# Patient Record
Sex: MALE | Race: WHITE | NOT HISPANIC OR LATINO
[De-identification: names, ages, dates, MRNs, and addresses within clinical notes are randomized per-mention and may not be internally consistent; named-entity substitution may affect disease eponyms.]

---

## 2017-08-18 ENCOUNTER — APPOINTMENT (OUTPATIENT)
Dept: GASTROENTEROLOGY | Facility: CLINIC | Age: 37
End: 2017-08-18
Payer: COMMERCIAL

## 2017-08-18 VITALS
WEIGHT: 197 LBS | HEIGHT: 70 IN | TEMPERATURE: 97.8 F | SYSTOLIC BLOOD PRESSURE: 140 MMHG | HEART RATE: 82 BPM | OXYGEN SATURATION: 99 % | DIASTOLIC BLOOD PRESSURE: 88 MMHG | BODY MASS INDEX: 28.2 KG/M2

## 2017-08-18 DIAGNOSIS — R12 HEARTBURN: ICD-10-CM

## 2017-08-18 DIAGNOSIS — M25.512 PAIN IN LEFT SHOULDER: ICD-10-CM

## 2017-08-18 DIAGNOSIS — R19.7 DIARRHEA, UNSPECIFIED: ICD-10-CM

## 2017-08-18 DIAGNOSIS — G89.29 PAIN IN LEFT SHOULDER: ICD-10-CM

## 2017-08-18 DIAGNOSIS — R10.9 UNSPECIFIED ABDOMINAL PAIN: ICD-10-CM

## 2017-08-18 DIAGNOSIS — R19.4 CHANGE IN BOWEL HABIT: ICD-10-CM

## 2017-08-18 DIAGNOSIS — Z80.0 FAMILY HISTORY OF MALIGNANT NEOPLASM OF DIGESTIVE ORGANS: ICD-10-CM

## 2017-08-18 DIAGNOSIS — K21.9 GASTRO-ESOPHAGEAL REFLUX DISEASE W/OUT ESOPHAGITIS: ICD-10-CM

## 2017-08-18 PROCEDURE — 99244 OFF/OP CNSLTJ NEW/EST MOD 40: CPT

## 2017-08-18 RX ORDER — ISOPROPYL ALCOHOL 700 MG/ML
LIQUID TOPICAL
Refills: 0 | Status: ACTIVE | COMMUNITY

## 2017-08-18 RX ORDER — PANTOPRAZOLE 20 MG/1
20 TABLET, DELAYED RELEASE ORAL DAILY
Qty: 30 | Refills: 3 | Status: ACTIVE | COMMUNITY
Start: 2017-08-18

## 2017-08-18 RX ORDER — BIFIDOBACTERIUM LONGUM 10MM CELL
4 CAPSULE ORAL
Qty: 30 | Refills: 3 | Status: ACTIVE | OUTPATIENT
Start: 2017-08-18

## 2017-08-18 RX ORDER — AMOXICILLIN 500 MG/1
500 CAPSULE ORAL
Qty: 42 | Refills: 0 | Status: ACTIVE | COMMUNITY
Start: 2017-06-08

## 2017-08-18 RX ORDER — BUPRENORPHINE HYDROCHLORIDE, NALOXONE HYDROCHLORIDE 4; 1 MG/1; MG/1
4-1 FILM, SOLUBLE BUCCAL; SUBLINGUAL
Qty: 30 | Refills: 0 | Status: ACTIVE | COMMUNITY
Start: 2017-05-11

## 2017-08-24 ENCOUNTER — APPOINTMENT (OUTPATIENT)
Dept: GASTROENTEROLOGY | Facility: CLINIC | Age: 37
End: 2017-08-24

## 2017-08-28 ENCOUNTER — APPOINTMENT (OUTPATIENT)
Dept: ULTRASOUND IMAGING | Facility: CLINIC | Age: 37
End: 2017-08-28

## 2017-10-04 ENCOUNTER — TRANSCRIPTION ENCOUNTER (OUTPATIENT)
Age: 37
End: 2017-10-04

## 2017-10-16 ENCOUNTER — TRANSCRIPTION ENCOUNTER (OUTPATIENT)
Age: 37
End: 2017-10-16

## 2018-01-29 ENCOUNTER — TRANSCRIPTION ENCOUNTER (OUTPATIENT)
Age: 38
End: 2018-01-29

## 2018-02-07 ENCOUNTER — TRANSCRIPTION ENCOUNTER (OUTPATIENT)
Age: 38
End: 2018-02-07

## 2018-02-24 ENCOUNTER — EMERGENCY (EMERGENCY)
Facility: HOSPITAL | Age: 38
LOS: 1 days | Discharge: HOME | End: 2018-02-24
Attending: EMERGENCY MEDICINE | Admitting: EMERGENCY MEDICINE
Payer: COMMERCIAL

## 2018-02-24 VITALS
DIASTOLIC BLOOD PRESSURE: 88 MMHG | SYSTOLIC BLOOD PRESSURE: 139 MMHG | HEART RATE: 117 BPM | RESPIRATION RATE: 16 BRPM | TEMPERATURE: 98 F | OXYGEN SATURATION: 100 %

## 2018-02-24 LAB
ALBUMIN SERPL ELPH-MCNC: 4.5 G/DL — SIGNIFICANT CHANGE UP (ref 3.3–5)
ALP SERPL-CCNC: 32 U/L — LOW (ref 40–120)
ALT FLD-CCNC: 16 U/L RC — SIGNIFICANT CHANGE UP (ref 10–45)
ANION GAP SERPL CALC-SCNC: 13 MMOL/L — SIGNIFICANT CHANGE UP (ref 5–17)
AST SERPL-CCNC: 18 U/L — SIGNIFICANT CHANGE UP (ref 10–40)
BASOPHILS # BLD AUTO: 0 K/UL — SIGNIFICANT CHANGE UP (ref 0–0.2)
BASOPHILS NFR BLD AUTO: 0.5 % — SIGNIFICANT CHANGE UP (ref 0–2)
BILIRUB SERPL-MCNC: 0.3 MG/DL — SIGNIFICANT CHANGE UP (ref 0.2–1.2)
BUN SERPL-MCNC: 8 MG/DL — SIGNIFICANT CHANGE UP (ref 7–23)
CALCIUM SERPL-MCNC: 9.6 MG/DL — SIGNIFICANT CHANGE UP (ref 8.4–10.5)
CHLORIDE SERPL-SCNC: 104 MMOL/L — SIGNIFICANT CHANGE UP (ref 96–108)
CO2 SERPL-SCNC: 27 MMOL/L — SIGNIFICANT CHANGE UP (ref 22–31)
CREAT SERPL-MCNC: 0.82 MG/DL — SIGNIFICANT CHANGE UP (ref 0.5–1.3)
EOSINOPHIL # BLD AUTO: 0.2 K/UL — SIGNIFICANT CHANGE UP (ref 0–0.5)
EOSINOPHIL NFR BLD AUTO: 3.2 % — SIGNIFICANT CHANGE UP (ref 0–6)
GLUCOSE SERPL-MCNC: 115 MG/DL — HIGH (ref 70–99)
HCT VFR BLD CALC: 38.1 % — LOW (ref 39–50)
HGB BLD-MCNC: 13.9 G/DL — SIGNIFICANT CHANGE UP (ref 13–17)
LYMPHOCYTES # BLD AUTO: 1.2 K/UL — SIGNIFICANT CHANGE UP (ref 1–3.3)
LYMPHOCYTES # BLD AUTO: 17.2 % — SIGNIFICANT CHANGE UP (ref 13–44)
MCHC RBC-ENTMCNC: 31.6 PG — SIGNIFICANT CHANGE UP (ref 27–34)
MCHC RBC-ENTMCNC: 36.4 GM/DL — HIGH (ref 32–36)
MCV RBC AUTO: 86.7 FL — SIGNIFICANT CHANGE UP (ref 80–100)
MONOCYTES # BLD AUTO: 0.4 K/UL — SIGNIFICANT CHANGE UP (ref 0–0.9)
MONOCYTES NFR BLD AUTO: 6.1 % — SIGNIFICANT CHANGE UP (ref 2–14)
NEUTROPHILS # BLD AUTO: 5.1 K/UL — SIGNIFICANT CHANGE UP (ref 1.8–7.4)
NEUTROPHILS NFR BLD AUTO: 73 % — SIGNIFICANT CHANGE UP (ref 43–77)
PLATELET # BLD AUTO: 209 K/UL — SIGNIFICANT CHANGE UP (ref 150–400)
POTASSIUM SERPL-MCNC: 3.9 MMOL/L — SIGNIFICANT CHANGE UP (ref 3.5–5.3)
POTASSIUM SERPL-SCNC: 3.9 MMOL/L — SIGNIFICANT CHANGE UP (ref 3.5–5.3)
PROT SERPL-MCNC: 7.5 G/DL — SIGNIFICANT CHANGE UP (ref 6–8.3)
RBC # BLD: 4.4 M/UL — SIGNIFICANT CHANGE UP (ref 4.2–5.8)
RBC # FLD: 10.9 % — SIGNIFICANT CHANGE UP (ref 10.3–14.5)
SODIUM SERPL-SCNC: 144 MMOL/L — SIGNIFICANT CHANGE UP (ref 135–145)
TROPONIN T SERPL-MCNC: <0.01 NG/ML — SIGNIFICANT CHANGE UP (ref 0–0.06)
WBC # BLD: 7 K/UL — SIGNIFICANT CHANGE UP (ref 3.8–10.5)
WBC # FLD AUTO: 7 K/UL — SIGNIFICANT CHANGE UP (ref 3.8–10.5)

## 2018-02-24 PROCEDURE — 93005 ELECTROCARDIOGRAM TRACING: CPT

## 2018-02-24 PROCEDURE — 99285 EMERGENCY DEPT VISIT HI MDM: CPT

## 2018-02-24 PROCEDURE — 80053 COMPREHEN METABOLIC PANEL: CPT

## 2018-02-24 PROCEDURE — 85027 COMPLETE CBC AUTOMATED: CPT

## 2018-02-24 PROCEDURE — 71046 X-RAY EXAM CHEST 2 VIEWS: CPT

## 2018-02-24 PROCEDURE — 71046 X-RAY EXAM CHEST 2 VIEWS: CPT | Mod: 26

## 2018-02-24 PROCEDURE — 84484 ASSAY OF TROPONIN QUANT: CPT

## 2018-02-24 PROCEDURE — 99283 EMERGENCY DEPT VISIT LOW MDM: CPT | Mod: 25

## 2018-02-24 RX ORDER — ESCITALOPRAM OXALATE 10 MG/1
0 TABLET, FILM COATED ORAL
Qty: 0 | Refills: 0 | COMMUNITY

## 2018-02-24 NOTE — ED PROVIDER NOTE - PHYSICAL EXAMINATION
Gen: No acute distress, alert, cooperative  Head: Normocephalic, Atraumatic  HEENT: PERRL, oral mucosa moist, normal conjunctiva. Left neck "sore" when palpated  Lung: CTAB, no respiratory distress, no crackles or wheezes  CV: rrr, no murmur  Abd: soft, NTND  MSK: No LE edema, Mildly tender diffusely over left shoulder  Neuro: No focal neurologic deficits  Skin: No rash  Psych: normal affect, follows commands Gen: No acute distress, alert, cooperative  Head: Normocephalic, Atraumatic  HEENT: PERRL, oral mucosa moist, normal conjunctiva. Left neck "sore" when palpated  Lung: CTAB, no respiratory distress, no crackles or wheezes  CV: rrr, no murmur  Abd: soft, NTND  MSK: No LE edema, Mildly tender diffusely over left shoulder, left arm, and left forearm. No numbness/tingling.   Neuro: No focal neurologic deficits  Skin: No rash  Psych: normal affect, follows commands

## 2018-02-24 NOTE — ED PROVIDER NOTE - SHIFT CHANGE DETAILS
***ATTENDING ADDENDUM (Dr. Lux Gilliland): I have received handoff from [facility]. Awaiting completion of ED diagnostics, including cardiac markers and CXR. Likely discharge home if negative. Will continue to observe and monitor closely.

## 2018-02-24 NOTE — ED PROVIDER NOTE - MEDICAL DECISION MAKING DETAILS
38yo male with hx of anxiety presenting with 3 weeks of intermittent left shoulder, chest, and arm pain. Numbness and tingling in left fingers and arm occasionally. Presently, mildly sore in shoulder and arm, otherwise appearing well. EKG normal. MSK vs cardiac. Tropx1, CXR, cbc/cmp, reassess. Likely d/c with ortho f/u. 38yo male with hx of anxiety presenting with 3 weeks of intermittent left shoulder, chest, and arm pain. Numbness and tingling in left fingers and arm occasionally. Presently, mildly sore in shoulder and arm, otherwise appearing well. EKG normal. MSK vs cardiac. Tropx1, CXR, cbc/cmp, reassess. Likely d/c with ortho f/u.    VIPIN Sol MD: Pt is a 36 y/o male with PMH of anxiety who p/w 3 weeks of intermittent left shoulder, upper chest and L arm pain. States that pain is worse after lifting weights, after sleeping on L shoulder, or when wearing tight clothing that pinches L shoulder/upper arm. Notes intermittent tingling in tips of fingers(most in middle and ring finger) and tingling in left forearm. Also notes his left forearm and left shoulder tender. Was seen at urgent care a week ago and told it was MSK related. Has only taken motrin for pain, was also rx flexeril, has not tried. Recently recovering from resolving sinus infection. Denies new injuries. Denies current pain. Denies: CP (current), SOB, leg pain/swelling, travel, trauma, immobilization, f/c, cough, HA, dizziness.  DDx: MSK pain (rotator cuff injury, muscle spasm, radiculopathy), less likely GERD / cardiac / pulmonary  Plan: CXR, trop, ekg, basic labs, likely d/c home with Ortho f/u

## 2018-02-24 NOTE — ED PROVIDER NOTE - OBJECTIVE STATEMENT
38yo male with hx of anxiety presenting with 3 weeks of intermittent left shoulder, chest, and arm pain. No known triggers, gets it both exertional and at rest. Notes intermittent tingling in tips of fingers(most in middle and ring finger) and tingling in left forearm. A few times tingling has gone into left jaw and forehead. Also notes his left forearm and left shoulder tender. Was seen at urgent care a week ago and told it was MSK related.  Recently recovering from resolving sinus infection, pain in shoulder/arm/chest worse with cough. Years ago, shattered 2-3 ribs on left side, no intervention at the time, still mildly tender.  Denies urinary symptoms, SOB, headache, abdominal pain. + resolving cough

## 2018-02-24 NOTE — ED ADULT NURSE NOTE - OBJECTIVE STATEMENT
37 male presents with left arm pain and intermittent numbness x 3 weeks.  States pain can involve ribs on left side which is the site of an old injury.  Pain worsens with movement.  Patient has been doing more exercises x 3 weeks.

## 2018-02-25 RX ORDER — CYCLOBENZAPRINE HYDROCHLORIDE 10 MG/1
1 TABLET, FILM COATED ORAL
Qty: 15 | Refills: 0 | OUTPATIENT
Start: 2018-02-25 | End: 2018-03-01

## 2018-05-21 ENCOUNTER — TRANSCRIPTION ENCOUNTER (OUTPATIENT)
Age: 38
End: 2018-05-21

## 2018-07-13 ENCOUNTER — APPOINTMENT (OUTPATIENT)
Dept: ORTHOPEDIC SURGERY | Facility: CLINIC | Age: 38
End: 2018-07-13
Payer: COMMERCIAL

## 2018-07-13 VITALS
BODY MASS INDEX: 23.7 KG/M2 | DIASTOLIC BLOOD PRESSURE: 82 MMHG | WEIGHT: 160 LBS | HEART RATE: 72 BPM | HEIGHT: 69 IN | SYSTOLIC BLOOD PRESSURE: 131 MMHG

## 2018-07-13 DIAGNOSIS — G89.29 LOW BACK PAIN: ICD-10-CM

## 2018-07-13 DIAGNOSIS — M54.5 LOW BACK PAIN: ICD-10-CM

## 2018-07-13 DIAGNOSIS — M54.2 CERVICALGIA: ICD-10-CM

## 2018-07-13 PROCEDURE — 72100 X-RAY EXAM L-S SPINE 2/3 VWS: CPT

## 2018-07-13 PROCEDURE — 72040 X-RAY EXAM NECK SPINE 2-3 VW: CPT

## 2018-07-13 PROCEDURE — 99203 OFFICE O/P NEW LOW 30 MIN: CPT

## 2018-07-13 PROCEDURE — 73590 X-RAY EXAM OF LOWER LEG: CPT | Mod: RT

## 2018-07-13 RX ORDER — AMOXICILLIN AND CLAVULANATE POTASSIUM 875; 125 MG/1; MG/1
875-125 TABLET, COATED ORAL
Qty: 14 | Refills: 0 | Status: ACTIVE | COMMUNITY
Start: 2018-01-29

## 2018-07-23 PROBLEM — Z80.0 FAMILY HISTORY OF COLON CANCER: Status: ACTIVE | Noted: 2017-08-18

## 2018-09-12 ENCOUNTER — APPOINTMENT (OUTPATIENT)
Dept: ORTHOPEDIC SURGERY | Facility: CLINIC | Age: 38
End: 2018-09-12

## 2021-04-28 ENCOUNTER — OUTPATIENT (OUTPATIENT)
Dept: OUTPATIENT SERVICES | Facility: HOSPITAL | Age: 41
LOS: 1 days | End: 2021-04-28
Payer: COMMERCIAL

## 2021-04-28 DIAGNOSIS — Z11.52 ENCOUNTER FOR SCREENING FOR COVID-19: ICD-10-CM

## 2021-04-28 PROBLEM — F41.9 ANXIETY DISORDER, UNSPECIFIED: Chronic | Status: ACTIVE | Noted: 2018-02-24

## 2021-04-28 LAB — SARS-COV-2 RNA SPEC QL NAA+PROBE: SIGNIFICANT CHANGE UP

## 2021-04-28 PROCEDURE — U0005: CPT

## 2021-04-28 PROCEDURE — C9803: CPT

## 2021-04-28 PROCEDURE — U0003: CPT

## 2021-08-02 ENCOUNTER — TRANSCRIPTION ENCOUNTER (OUTPATIENT)
Age: 41
End: 2021-08-02

## 2021-08-03 ENCOUNTER — TRANSCRIPTION ENCOUNTER (OUTPATIENT)
Age: 41
End: 2021-08-03

## 2022-04-17 ENCOUNTER — TRANSCRIPTION ENCOUNTER (OUTPATIENT)
Age: 42
End: 2022-04-17

## 2023-01-09 ENCOUNTER — NON-APPOINTMENT (OUTPATIENT)
Age: 43
End: 2023-01-09

## 2023-02-02 ENCOUNTER — OUTPATIENT (OUTPATIENT)
Dept: OUTPATIENT SERVICES | Facility: HOSPITAL | Age: 43
LOS: 1 days | Discharge: STILL ADMITTED OR ADMINISTRATIVE | End: 2023-02-02

## 2023-02-03 DIAGNOSIS — F33.1 MAJOR DEPRESSIVE DISORDER, RECURRENT, MODERATE: ICD-10-CM

## 2023-02-16 ENCOUNTER — TRANSCRIPTION ENCOUNTER (OUTPATIENT)
Age: 43
End: 2023-02-16

## 2023-05-07 ENCOUNTER — NON-APPOINTMENT (OUTPATIENT)
Age: 43
End: 2023-05-07

## 2023-10-02 ENCOUNTER — APPOINTMENT (OUTPATIENT)
Dept: ORTHOPEDIC SURGERY | Facility: CLINIC | Age: 43
End: 2023-10-02
Payer: COMMERCIAL

## 2023-10-02 VITALS — BODY MASS INDEX: 18.96 KG/M2 | HEIGHT: 69 IN | WEIGHT: 128 LBS

## 2023-10-02 DIAGNOSIS — S32.810A MULTIPLE FRACTURES OF PELVIS WITH STABLE DISRUPTION OF PELVIC RING, INITIAL ENCOUNTER FOR CLOSED FRACTURE: ICD-10-CM

## 2023-10-02 DIAGNOSIS — F11.11 OPIOID ABUSE, IN REMISSION: ICD-10-CM

## 2023-10-02 DIAGNOSIS — Z87.19 PERSONAL HISTORY OF OTHER DISEASES OF THE DIGESTIVE SYSTEM: ICD-10-CM

## 2023-10-02 DIAGNOSIS — F41.9 ANXIETY DISORDER, UNSPECIFIED: ICD-10-CM

## 2023-10-02 DIAGNOSIS — F32.A ANXIETY DISORDER, UNSPECIFIED: ICD-10-CM

## 2023-10-02 DIAGNOSIS — Z86.19 PERSONAL HISTORY OF OTHER INFECTIOUS AND PARASITIC DISEASES: ICD-10-CM

## 2023-10-02 PROCEDURE — 73503 X-RAY EXAM HIP UNI 4/> VIEWS: CPT | Mod: LT

## 2023-10-02 PROCEDURE — 27197 CLSD TX PELVIC RING FX: CPT

## 2023-10-02 PROCEDURE — 99203 OFFICE O/P NEW LOW 30 MIN: CPT | Mod: 57

## 2023-10-09 ENCOUNTER — APPOINTMENT (OUTPATIENT)
Dept: ORTHOPEDIC SURGERY | Facility: CLINIC | Age: 43
End: 2023-10-09

## 2024-06-17 NOTE — ED ADULT TRIAGE NOTE - RESPIRATORY RATE (BREATHS/MIN)
16
Expiration Date (Optional): 10-
Urine Pregnancy Test Result: negative
Detail Level: None
Performed By: Jenny Joshi CMA
Lot # (Optional): [id number]

## 2025-02-13 ENCOUNTER — INPATIENT (INPATIENT)
Facility: HOSPITAL | Age: 45
LOS: 7 days | Discharge: HOME | DRG: 392 | End: 2025-02-21
Attending: HOSPITALIST | Admitting: STUDENT IN AN ORGANIZED HEALTH CARE EDUCATION/TRAINING PROGRAM
Payer: COMMERCIAL

## 2025-02-13 VITALS
SYSTOLIC BLOOD PRESSURE: 106 MMHG | HEART RATE: 77 BPM | TEMPERATURE: 98 F | HEIGHT: 69 IN | DIASTOLIC BLOOD PRESSURE: 73 MMHG | OXYGEN SATURATION: 100 % | RESPIRATION RATE: 16 BRPM | WEIGHT: 104.94 LBS

## 2025-02-13 DIAGNOSIS — Z29.9 ENCOUNTER FOR PROPHYLACTIC MEASURES, UNSPECIFIED: ICD-10-CM

## 2025-02-13 DIAGNOSIS — R63.4 ABNORMAL WEIGHT LOSS: ICD-10-CM

## 2025-02-13 LAB
ALBUMIN SERPL ELPH-MCNC: 4.2 G/DL — SIGNIFICANT CHANGE UP (ref 3.3–5)
ALP SERPL-CCNC: 36 U/L — LOW (ref 40–120)
ALT FLD-CCNC: 21 U/L — SIGNIFICANT CHANGE UP (ref 10–45)
ANION GAP SERPL CALC-SCNC: 14 MMOL/L — SIGNIFICANT CHANGE UP (ref 5–17)
AST SERPL-CCNC: 16 U/L — SIGNIFICANT CHANGE UP (ref 10–40)
BASOPHILS # BLD AUTO: 0.04 K/UL — SIGNIFICANT CHANGE UP (ref 0–0.2)
BASOPHILS NFR BLD AUTO: 0.5 % — SIGNIFICANT CHANGE UP (ref 0–2)
BILIRUB SERPL-MCNC: 0.4 MG/DL — SIGNIFICANT CHANGE UP (ref 0.2–1.2)
BUN SERPL-MCNC: 14 MG/DL — SIGNIFICANT CHANGE UP (ref 7–23)
CALCIUM SERPL-MCNC: 10.1 MG/DL — SIGNIFICANT CHANGE UP (ref 8.4–10.5)
CHLORIDE SERPL-SCNC: 100 MMOL/L — SIGNIFICANT CHANGE UP (ref 96–108)
CO2 SERPL-SCNC: 24 MMOL/L — SIGNIFICANT CHANGE UP (ref 22–31)
CREAT SERPL-MCNC: 0.73 MG/DL — SIGNIFICANT CHANGE UP (ref 0.5–1.3)
EGFR: 116 ML/MIN/1.73M2 — SIGNIFICANT CHANGE UP
EOSINOPHIL # BLD AUTO: 0.05 K/UL — SIGNIFICANT CHANGE UP (ref 0–0.5)
EOSINOPHIL NFR BLD AUTO: 0.6 % — SIGNIFICANT CHANGE UP (ref 0–6)
GLUCOSE SERPL-MCNC: 100 MG/DL — HIGH (ref 70–99)
HCT VFR BLD CALC: 40.1 % — SIGNIFICANT CHANGE UP (ref 39–50)
HGB BLD-MCNC: 13.7 G/DL — SIGNIFICANT CHANGE UP (ref 13–17)
IMM GRANULOCYTES NFR BLD AUTO: 0.6 % — SIGNIFICANT CHANGE UP (ref 0–0.9)
LIDOCAIN IGE QN: 49 U/L — SIGNIFICANT CHANGE UP (ref 7–60)
LYMPHOCYTES # BLD AUTO: 0.92 K/UL — LOW (ref 1–3.3)
LYMPHOCYTES # BLD AUTO: 10.8 % — LOW (ref 13–44)
MAGNESIUM SERPL-MCNC: 2.3 MG/DL — SIGNIFICANT CHANGE UP (ref 1.6–2.6)
MCHC RBC-ENTMCNC: 28.8 PG — SIGNIFICANT CHANGE UP (ref 27–34)
MCHC RBC-ENTMCNC: 34.2 G/DL — SIGNIFICANT CHANGE UP (ref 32–36)
MCV RBC AUTO: 84.4 FL — SIGNIFICANT CHANGE UP (ref 80–100)
MONOCYTES # BLD AUTO: 0.83 K/UL — SIGNIFICANT CHANGE UP (ref 0–0.9)
MONOCYTES NFR BLD AUTO: 9.7 % — SIGNIFICANT CHANGE UP (ref 2–14)
NEUTROPHILS # BLD AUTO: 6.65 K/UL — SIGNIFICANT CHANGE UP (ref 1.8–7.4)
NEUTROPHILS NFR BLD AUTO: 77.8 % — HIGH (ref 43–77)
NRBC BLD AUTO-RTO: 0 /100 WBCS — SIGNIFICANT CHANGE UP (ref 0–0)
PHOSPHATE SERPL-MCNC: 3 MG/DL — SIGNIFICANT CHANGE UP (ref 2.5–4.5)
PLATELET # BLD AUTO: 394 K/UL — SIGNIFICANT CHANGE UP (ref 150–400)
POTASSIUM SERPL-MCNC: 4 MMOL/L — SIGNIFICANT CHANGE UP (ref 3.5–5.3)
POTASSIUM SERPL-SCNC: 4 MMOL/L — SIGNIFICANT CHANGE UP (ref 3.5–5.3)
PROT SERPL-MCNC: 7.6 G/DL — SIGNIFICANT CHANGE UP (ref 6–8.3)
RBC # BLD: 4.75 M/UL — SIGNIFICANT CHANGE UP (ref 4.2–5.8)
RBC # FLD: 11.9 % — SIGNIFICANT CHANGE UP (ref 10.3–14.5)
SODIUM SERPL-SCNC: 138 MMOL/L — SIGNIFICANT CHANGE UP (ref 135–145)
WBC # BLD: 8.54 K/UL — SIGNIFICANT CHANGE UP (ref 3.8–10.5)
WBC # FLD AUTO: 8.54 K/UL — SIGNIFICANT CHANGE UP (ref 3.8–10.5)

## 2025-02-13 PROCEDURE — 74177 CT ABD & PELVIS W/CONTRAST: CPT | Mod: 26

## 2025-02-13 PROCEDURE — 99285 EMERGENCY DEPT VISIT HI MDM: CPT

## 2025-02-13 PROCEDURE — 99223 1ST HOSP IP/OBS HIGH 75: CPT

## 2025-02-13 RX ORDER — GABAPENTIN 400 MG/1
100 CAPSULE ORAL THREE TIMES A DAY
Refills: 0 | Status: DISCONTINUED | OUTPATIENT
Start: 2025-02-13 | End: 2025-02-14

## 2025-02-13 RX ORDER — LEVOCETIRIZINE DIHYDROCHLORIDE 5 MG/1
1 TABLET ORAL
Refills: 0 | DISCHARGE

## 2025-02-13 RX ORDER — ONDANSETRON HCL/PF 4 MG/2 ML
4 VIAL (ML) INJECTION EVERY 8 HOURS
Refills: 0 | Status: DISCONTINUED | OUTPATIENT
Start: 2025-02-13 | End: 2025-02-21

## 2025-02-13 RX ORDER — MELATONIN 5 MG
3 TABLET ORAL AT BEDTIME
Refills: 0 | Status: DISCONTINUED | OUTPATIENT
Start: 2025-02-13 | End: 2025-02-21

## 2025-02-13 RX ORDER — ACETAMINOPHEN 500 MG/5ML
650 LIQUID (ML) ORAL EVERY 6 HOURS
Refills: 0 | Status: DISCONTINUED | OUTPATIENT
Start: 2025-02-13 | End: 2025-02-21

## 2025-02-13 RX ORDER — SERTRALINE 100 MG/1
50 TABLET, FILM COATED ORAL DAILY
Refills: 0 | Status: DISCONTINUED | OUTPATIENT
Start: 2025-02-13 | End: 2025-02-21

## 2025-02-13 RX ORDER — MAGNESIUM, ALUMINUM HYDROXIDE 200-200 MG
30 TABLET,CHEWABLE ORAL EVERY 4 HOURS
Refills: 0 | Status: DISCONTINUED | OUTPATIENT
Start: 2025-02-13 | End: 2025-02-21

## 2025-02-13 RX ORDER — MAGNESIUM, ALUMINUM HYDROXIDE 200-200 MG
10 TABLET,CHEWABLE ORAL ONCE
Refills: 0 | Status: COMPLETED | OUTPATIENT
Start: 2025-02-13 | End: 2025-02-13

## 2025-02-13 RX ORDER — ALPRAZOLAM 0.5 MG
1 TABLET, EXTENDED RELEASE 24 HR ORAL
Refills: 0 | DISCHARGE

## 2025-02-13 RX ORDER — ALPRAZOLAM 0.5 MG
1 TABLET, EXTENDED RELEASE 24 HR ORAL
Refills: 0 | Status: DISCONTINUED | OUTPATIENT
Start: 2025-02-13 | End: 2025-02-14

## 2025-02-13 RX ORDER — MIRTAZAPINE 30 MG/1
7.5 TABLET, FILM COATED ORAL AT BEDTIME
Refills: 0 | Status: DISCONTINUED | OUTPATIENT
Start: 2025-02-13 | End: 2025-02-21

## 2025-02-13 RX ADMIN — Medication 1 MILLIGRAM(S): at 21:58

## 2025-02-13 RX ADMIN — Medication 650 MILLIGRAM(S): at 21:50

## 2025-02-13 RX ADMIN — Medication 20 MILLIGRAM(S): at 16:03

## 2025-02-13 RX ADMIN — Medication 10 MILLILITER(S): at 16:02

## 2025-02-13 RX ADMIN — Medication 650 MILLIGRAM(S): at 20:21

## 2025-02-13 RX ADMIN — GABAPENTIN 100 MILLIGRAM(S): 400 CAPSULE ORAL at 21:56

## 2025-02-13 RX ADMIN — Medication 1000 MILLILITER(S): at 16:02

## 2025-02-13 NOTE — ED PROVIDER NOTE - PROGRESS NOTE DETAILS
sg ct negative, states possible lower lobe opacity, pna, although pt has no pulmonary sx, no cough or sob.  vitals stable no concern for toxic megacolon, electrolytes wnl. will not treat w out sample given this is chronic and his sx today more concerned w weight loss and chronic pain. will send stool sample and follow up with outpatient gi sg pt stating he has had difficulty swallowing solids due to pain with eating in hiis stomach. concern for esophageal spasms. pt is cachectic, will adm for gi barium swallow study.

## 2025-02-13 NOTE — ED ADULT NURSE NOTE - OBJECTIVE STATEMENT
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 Pt reports his abdominal pain has worsened since november associated with normal stools and loose stools. Pt reports he has lost abut 20 pounds. Upon arrival, pt is A&OX4, astting well on RA. Afebrile orally. Abdomen is of, diffuse tenderness.  Denies headache, dizziness, vision changes, chest pain, shortness of breath, nausea, vomiting, diarrhea, fevers, chills, dysuria, hematuria, recent illness travel or fall.

## 2025-02-13 NOTE — H&P ADULT - PROBLEM SELECTOR PLAN 1
R/O mesenteric ischemia and esophageal stricture  Barium esophagram and abd duplex  Had EGD 11/24 s/ gastritis- PPI and Pepcid  Sertraline  Gabapentin

## 2025-02-13 NOTE — H&P ADULT - PROBLEM SELECTOR PLAN 3
Alprazolam 2mg PO BID prescribed at [facility] last picked up 30 day supply 2/08. He says that he takes 1mg BID and then 2mg QHS for sleep

## 2025-02-13 NOTE — ED PROVIDER NOTE - CLINICAL SUMMARY MEDICAL DECISION MAKING FREE TEXT BOX
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 difficile highly unlikely as patient reports relatively normal stool today IV Pepcid Maalox fluids and reevaluate after labs and imaging

## 2025-02-13 NOTE — ED PROVIDER NOTE - NSFOLLOWUPINSTRUCTIONS_ED_ALL_ED_FT
You were seen for abdominal pain. Your CT scan was unremarkable for any intraabdominal pathology. Follow up with your Gastroenterologist.    Return with any fever, increasing pain, blood in your stool or any other concerning symptoms.

## 2025-02-13 NOTE — H&P ADULT - NSHPLABSRESULTS_GEN_ALL_CORE
13.7   8.54  )-----------( 394      ( 13 Feb 2025 14:58 )             40.1     13 Feb 2025 14:58    138    |  100    |  14     ----------------------------<  100    4.0     |  24     |  0.73     Ca    10.1       13 Feb 2025 14:58  Phos  3.0       13 Feb 2025 14:58  Mg     2.3       13 Feb 2025 14:58    TPro  7.6    /  Alb  4.2    /  TBili  0.4    /  DBili  x      /  AST  16     /  ALT  21     /  AlkPhos  36     13 Feb 2025 14:58    LIVER FUNCTIONS - ( 13 Feb 2025 14:58 )  Alb: 4.2 g/dL / Pro: 7.6 g/dL / ALK PHOS: 36 U/L / ALT: 21 U/L / AST: 16 U/L / GGT: x             CAPILLARY BLOOD GLUCOSE            Urinalysis Basic - ( 13 Feb 2025 14:58 )    Color: x / Appearance: x / SG: x / pH: x  Gluc: 100 mg/dL / Ketone: x  / Bili: x / Urobili: x   Blood: x / Protein: x / Nitrite: x   Leuk Esterase: x / RBC: x / WBC x   Sq Epi: x / Non Sq Epi: x / Bacteria: x

## 2025-02-13 NOTE — ED PROVIDER NOTE - PATIENT PORTAL LINK FT
You can access the FollowMyHealth Patient Portal offered by [facility] by registering at the following website: [email or web address]. By joining [employer]’s FollowMyHealth portal, you will also be able to view your health information using other applications (apps) compatible with our system.

## 2025-02-13 NOTE — ED ADULT NURSE REASSESSMENT NOTE - NS ED NURSE REASSESS COMMENT FT1
Report received from [facility]. On re-assessment pt currently appears comfortable resting in stretcher in room with appropriate side rails up for safety. pt is awake and alert, vital signs stable, breathing even and unlabored, NAD noted at this time. Plan of care discussed with pt - pt c/o HA, tylenol to be given. Pt pending bed upstairs.

## 2025-02-13 NOTE — H&P ADULT - NSHPPHYSICALEXAM_GEN_ALL_CORE
ICU Vital Signs Last 24 Hrs  T(C): 36.4 (13 Feb 2025 16:11), Max: 36.6 (13 Feb 2025 11:55)  T(F): 97.6 (13 Feb 2025 16:11), Max: 97.9 (13 Feb 2025 11:55)  HR: 53 (13 Feb 2025 16:11) (53 - 77)  BP: 103/68 (13 Feb 2025 16:11) (103/68 - 106/73)  BP(mean): --  ABP: --  ABP(mean): --  RR: 17 (13 Feb 2025 16:11) (16 - 17)  SpO2: 98% (13 Feb 2025 16:11) (98% - 100%)    O2 Parameters below as of 13 Feb 2025 16:11  Patient On (Oxygen Delivery Method): room air

## 2025-02-13 NOTE — ED PROVIDER NOTE - RAPID ASSESSMENT
45yo M with PMH of recurrent c.diff (follows with ID) s/p fecal transplant last December 2023 and since negative, presenting with weight loss and abdominal pain every time he eats for past 3-4 months. 20 lbs loss since then. Describes pain as tightness over whole abdomen despite what he eats. Of note, had flu 3 weeks ago. Reports he has had endoscopy with biopsies, and CT scans with no diagnosis. GI didn't think colonoscopy needed since last was 1.5 years ago. Told possibly IBS. On omeprazole right now. Referred to rheum but reports not feeling well enough this past month to go. +dizziness and generalized weakness. Denies fever/chills, vomiting. Reports stools alternate firm or soft but no watery diarrhea.     Patient was rapidly assessed by me,  Tosin Johnson PA-C, in ED triage. A limited history was obtained. The patient will be seen and further examined/worked up in the main ED and their care will be completed by the main ED team. Receiving team will follow up on labs, analgesia, any clinical imaging, and perform reassessment and disposition of the patient as clinically indicated. All decisions regarding the progression of care will be made at their discretion. 43yo M with PMH of recurrent c.diff (follows with ID) s/p fecal transplant last December 2023 and since negative, presenting with weight loss and abdominal pain every time he eats for past 3-4 months. 20 lbs loss since then. Describes pain as tightness over whole abdomen despite what he eats. Of note, had flu 3 weeks ago. Reports he has had endoscopy with biopsies, and CT scans with no diagnosis. GI didn't think colonoscopy needed since last was 1.5 years ago. Told possibly IBS. On omeprazole right now. Referred to rheum but reports not feeling well enough this past month to go. +dizziness and generalized weakness. Denies fever/chills, vomiting. Reports stools alternate firm or soft but no watery diarrhea.     Patient was rapidly assessed by me,  Tosin Johnson PA-C, in ED triage. A limited history was obtained. The patient will be seen and further examined/worked up in the main ED and their care will be completed by the main ED team. Receiving team will follow up on labs, analgesia, any clinical imaging, and perform reassessment and disposition of the patient as clinically indicated. All decisions regarding the progression of care will be made at their discretion.    Attending MD Santa: This patient was seen and orders were placed by the PA as per our department's QPA model.  I was not consulted in regards to this patient although I was present and available in the Emergency Department to the PA.  Patient was to be sent to main ED for full medical evaluation and receiving team was to follow up on any labs, analgesia, clinical imaging ordered by the PA.  Any reassessment and disposition decisions were to be made by receiving team as clinically indicated, all decisions regarding the progression of care to be made at their discretion.  I did not perform a comprehensive history and physical on this patient.

## 2025-02-13 NOTE — H&P ADULT - HISTORY OF PRESENT ILLNESS
44M pmhx of recurrent C. Diff s/p fecal transplant now in remission presents with 40lb weight loss over the last few months due to post prandial pain.  He says that his symptoms began after a round of antibiotics for mastoiditis.  He then developed C diff x 5 over 1 year and received Vowst.  After that he was able to regain the weight he lost over the year with C. Diff back up to 145lbs (baseilne was 170lbs). He then began developing post prandial pain with eating.  He says he gets excruciating pain within 20 minutes after eating anything. The pain is sharp/stabbing and "tightness".   He estimates his daily intake to be approx 500calories per day. He presents today after diarrhea yesterday and ongoing weightloss.   He has had EGD 11/24 showing gastritis only.

## 2025-02-14 PROCEDURE — 93975 VASCULAR STUDY: CPT | Mod: 26

## 2025-02-14 PROCEDURE — 99233 SBSQ HOSP IP/OBS HIGH 50: CPT

## 2025-02-14 PROCEDURE — 99222 1ST HOSP IP/OBS MODERATE 55: CPT | Mod: GC

## 2025-02-14 RX ORDER — INFLUENZA A VIRUS A/IDAHO/07/2018 (H1N1) ANTIGEN (MDCK CELL DERIVED, PROPIOLACTONE INACTIVATED, INFLUENZA A VIRUS A/INDIANA/08/2018 (H3N2) ANTIGEN (MDCK CELL DERIVED, PROPIOLACTONE INACTIVATED), INFLUENZA B VIRUS B/SINGAPORE/INFTT-16-0610/2016 ANTIGEN (MDCK CELL DERIVED, PROPIOLACTONE INACTIVATED), INFLUENZA B VIRUS B/IOWA/06/2017 ANTIGEN (MDCK CELL DERIVED, PROPIOLACTONE INACTIVATED) 15; 15; 15; 15 UG/.5ML; UG/.5ML; UG/.5ML; UG/.5ML
0.5 INJECTION, SUSPENSION INTRAMUSCULAR ONCE
Refills: 0 | Status: DISCONTINUED | OUTPATIENT
Start: 2025-02-14 | End: 2025-02-21

## 2025-02-14 RX ORDER — ALPRAZOLAM 0.5 MG
1 TABLET, EXTENDED RELEASE 24 HR ORAL
Refills: 0 | Status: DISCONTINUED | OUTPATIENT
Start: 2025-02-14 | End: 2025-02-21

## 2025-02-14 RX ORDER — ALPRAZOLAM 0.5 MG
1 TABLET, EXTENDED RELEASE 24 HR ORAL ONCE
Refills: 0 | Status: DISCONTINUED | OUTPATIENT
Start: 2025-02-14 | End: 2025-02-14

## 2025-02-14 RX ORDER — B1/B2/B3/B5/B6/B12/VIT C/FOLIC 500-0.5 MG
1 TABLET ORAL DAILY
Refills: 0 | Status: DISCONTINUED | OUTPATIENT
Start: 2025-02-14 | End: 2025-02-21

## 2025-02-14 RX ORDER — ALPRAZOLAM 0.5 MG
2 TABLET, EXTENDED RELEASE 24 HR ORAL AT BEDTIME
Refills: 0 | Status: COMPLETED | OUTPATIENT
Start: 2025-02-14 | End: 2025-02-21

## 2025-02-14 RX ORDER — GABAPENTIN 400 MG/1
300 CAPSULE ORAL THREE TIMES A DAY
Refills: 0 | Status: DISCONTINUED | OUTPATIENT
Start: 2025-02-14 | End: 2025-02-21

## 2025-02-14 RX ADMIN — Medication 650 MILLIGRAM(S): at 17:58

## 2025-02-14 RX ADMIN — Medication 20 MILLIGRAM(S): at 11:10

## 2025-02-14 RX ADMIN — MIRTAZAPINE 7.5 MILLIGRAM(S): 30 TABLET, FILM COATED ORAL at 21:41

## 2025-02-14 RX ADMIN — Medication 1 MILLIGRAM(S): at 05:14

## 2025-02-14 RX ADMIN — Medication 1 MILLIGRAM(S): at 17:22

## 2025-02-14 RX ADMIN — Medication 2 MILLIGRAM(S): at 21:41

## 2025-02-14 RX ADMIN — GABAPENTIN 300 MILLIGRAM(S): 400 CAPSULE ORAL at 21:41

## 2025-02-14 RX ADMIN — GABAPENTIN 100 MILLIGRAM(S): 400 CAPSULE ORAL at 05:13

## 2025-02-14 RX ADMIN — MIRTAZAPINE 7.5 MILLIGRAM(S): 30 TABLET, FILM COATED ORAL at 00:25

## 2025-02-14 RX ADMIN — GABAPENTIN 300 MILLIGRAM(S): 400 CAPSULE ORAL at 14:32

## 2025-02-14 NOTE — DIETITIAN INITIAL EVALUATION ADULT - ADD RECOMMEND
[X] Continue with Regular Diet; Defer texture/consistency to Speech Language Pathologist prn.          --> Noted lactose intolerance  [X] Consider adding multivitamin once daily for micronutrient coverage as medically feasible  [X] Malnutrition/BMI <19 sticker placed in chart   [X] RD will continue to monitor PO intake, GI tolerance, weight trends, skin integrity, BMs, labs/electrolytes prn.   RD remains available upon request.

## 2025-02-14 NOTE — DIETITIAN NUTRITION RISK NOTIFICATION - WEIGHT LOSS
> 10% in 6 months additional history taking/interpretation of diagnostic studies/documentation/direct patient care (not related to procedure)

## 2025-02-14 NOTE — DIETITIAN INITIAL EVALUATION ADULT - PROBLEM SELECTOR PLAN 3
Alprazolam 2mg PO BID prescribed at [facility] last picked up 30 day supply 2/08. He says that he takes 1mg BID and then 2mg QHS for sleep

## 2025-02-14 NOTE — DIETITIAN INITIAL EVALUATION ADULT - PERSON TAUGHT/METHOD
Spouse (via phone)/verbal instruction/written material/teach back - (Patient repeats in own words)/patient instructed/spouse instructed

## 2025-02-14 NOTE — CONSULT NOTE ADULT - SUBJECTIVE AND OBJECTIVE BOX
GI CONSULT NOTE    This is a 44 year old male presenting with epigastric and lower abdominal pain.     History of Present Illness: This is a 44 year old male with a pertinent history of chronic Clostridium difficile infection (dx in Jan. 2023 with 4-5x recurrence, s/p multiple Dificid, s/p VOWST fecal transplant Dec 2023) presenting with a one-year history lower abdominal pain and 3 month history of epigastric cramping. Pt initially diagnosed in Jan 2023 after being prescribed multiple antibiotic medications for an uncomplicated mastoiditis including clindamycin, which subsequently caused him to begin having multiple bouts of watery diarrhea of up to 30x in a day. Patient remarks that since the fecal transplant in Dec. 2023 he has no new bouts of C. diff infections after being tested 5x throughout 2024 and reported no significant bouts of diarrhea since last year. Pt reported having lost over 40 lbs from 170 to 130 lbs in 2023.     After the fecal transplant, there has been a persistent lower abdominal pain that worsens throughout the day, particularly during the evening after eating dinner. Pain described as burning 4/10 that lasts throughout the evening, exacerbated by meals. Denies associated nausea, vomiting, diarrhea, fevers, constipation, inability to pass flatus. Painful events occur on a daily basis since initiating transplant. Since November, pt noted having a sharp, stabbing epigastric pain that worsens after eating. Notes a sour taste in mouth on occasion. Pt also remarks feeling bloated and gestures to his midepigastric region saying that it feels "backed up to here." This has worsened the patient's oral intake due to fear of the pain, despite still having a good appetite.        PMHx: C.diff   Home Meds:   Gabapentin 300mg TID  Xanax 4mg  Xyzal  Mirtazapine  Singulair   Pepcid 40mg qD  Omeprazole extended release  SOCIAL HISTORY:  Prior alcohol use disorder, quit 16 years ago. Daily vaping. No recreational drug use. Lives at home with family and daughters.      MEDICATIONS:  acetaminophen     Tablet .. 650 milliGRAM(s) Oral every 6 hours PRN  ALPRAZolam 1 milliGRAM(s) Oral two times a day  gabapentin 300 milliGRAM(s) Oral three times a day  melatonin 3 milliGRAM(s) Oral at bedtime PRN  mirtazapine 7.5 milliGRAM(s) Oral at bedtime  ondansetron Injectable 4 milliGRAM(s) IV Push every 8 hours PRN  sertraline 50 milliGRAM(s) Oral daily    aluminum hydroxide/magnesium hydroxide/simethicone Suspension 30 milliLiter(s) Oral every 4 hours PRN  famotidine    Tablet 20 milliGRAM(s) Oral daily  pantoprazole    Tablet 40 milliGRAM(s) Oral at bedtime      REVIEW OF SYSTEMS:  CV: chest pain (-), palpitation (-),   PULM: SOB (-), cough (-), wheezing (-)  CONST: fever (-), chills (-) or fatigue (-)  GI: abdominal distension (-), abdominal pain (-) , nausea/vomiting (-), hematemesis, (-), melena (-), hematochezia (-)  NEURO: numbness (-), weakness (-), dizziness (-)  SKIN: itching (-), rash (-)  HEENT:  visual changes (-); vertigo or throat pain (-);  neck stiffness (-)     All other review of systems is negative unless indicated above.   -------------------------------------------------------------------------------------------  VITALS:  T(C): 36.4 (02-14-25 @ 07:17), Max: 36.8 (02-14-25 @ 05:10)  HR: 60 (02-14-25 @ 07:17) (53 - 66)  BP: 95/60 (02-14-25 @ 07:17) (95/60 - 110/58)  RR: 20 (02-14-25 @ 07:17) (16 - 20)  SpO2: 99% (02-14-25 @ 07:17) (98% - 99%)  Wt(kg): --  I&O's Summary      PHYSICAL EXAM:  GEN: NAD, sitting in bed  HEENT: NCAT  CV: RRR, Ns1/s2  RESP: CTA B/l, no w/r/r  ABD: rigid abdomen in all four quadrants, tympanic in RUQ and LUQ  EXT: warm, 2+ pulses, no edema  SKIN: no visible lesions, rashes  NEURO: AAOx3  PSYCH: Calm, cooperative    -------------------------------------------------------------------------------------------  LABS:                          13.7   8.54  )-----------( 394      ( 13 Feb 2025 14:58 )             40.1     02-13    138  |  100  |  14  ----------------------------<  100[H]  4.0   |  24  |  0.73    Ca    10.1      13 Feb 2025 14:58  Phos  3.0     02-13  Mg     2.3     02-13    TPro  7.6  /  Alb  4.2  /  TBili  0.4  /  DBili  x   /  AST  16  /  ALT  21  /  AlkPhos  36[L]  02-13      #  #  #    Recommendations:  -     Note is incomplete    *** Recommendations are preliminary until cosigned by the attending.    Kevan Guillen MD  Cardiology Fellow    For all New Consults and Questions:  [email or web address]   Login: [email or web address]   GI CONSULT NOTE    This is a 44 year old male presenting with epigastric and lower abdominal pain.     History of Present Illness: This is a 44 year old male with a pertinent history of chronic Clostridium difficile infection (dx in Jan. 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 GI consulted for these complaints.     Pt initially diagnosed in Jan 2023 after being prescribed multiple antibiotic medications for an uncomplicated mastoiditis including clindamycin, which subsequently caused him to begin having multiple bouts of watery diarrhea of up to 30x in a day. Patient remarks that since the fecal transplant in Dec. 2023 he has no new bouts of C. diff infections after being tested 5x throughout 2024 and reported no significant bouts of diarrhea since last year. Pt reported having lost over 40 lbs from 170 to 130 lbs in 2023. After the fecal transplant, there has been a persistent lower abdominal pain that worsens throughout the day, particularly during the evening after eating dinner. Pain described as burning 4/10 that lasts throughout the evening, exacerbated by meals. Denies associated nausea, vomiting, diarrhea, fevers, constipation, inability to pass flatus. Painful events occur on a daily basis since initiating transplant. Since November, pt noted having a sharp, stabbing epigastric pain that worsens after eating. Notes a sour taste in mouth on occasion. Pt also remarks feeling bloated and gestures to his midepigastric region saying that it feels "backed up to here." This has worsened the patient's oral intake due to fear of the pain, despite still having a good appetite. Patient also reports acid reflux, sour taste in mouth, and occasional difficulty in food going down; he is currently on omeprazole 40mg qAM and pepsid 40mg qhs.       Patient has had recent EGD and colonoscopy. Last EGD was in 11/2024 and showed medium HH and gastritis, negative for H pylori and IM. Last colonoscopy was in 7/2023 with internal hemorrhoids but otherwise, unremarkable. Bx negative.      PMHx: C.diff   Home Meds:   Gabapentin 300mg TID  Xanax 4mg  Xyzal  Mirtazapine  Singulair   Pepcid 40mg qD  Omeprazole extended release  SOCIAL HISTORY:  Prior alcohol use disorder, quit 16 years ago. Daily vaping. No recreational drug use. Lives at home with family and daughters.    In the ED, VSS. Labs grossly unremarkable. CT unremarkable for GI pathology. 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     MEDICATIONS:  acetaminophen     Tablet .. 650 milliGRAM(s) Oral every 6 hours PRN  ALPRAZolam 1 milliGRAM(s) Oral two times a day  gabapentin 300 milliGRAM(s) Oral three times a day  melatonin 3 milliGRAM(s) Oral at bedtime PRN  mirtazapine 7.5 milliGRAM(s) Oral at bedtime  ondansetron Injectable 4 milliGRAM(s) IV Push every 8 hours PRN  sertraline 50 milliGRAM(s) Oral daily    aluminum hydroxide/magnesium hydroxide/simethicone Suspension 30 milliLiter(s) Oral every 4 hours PRN  famotidine    Tablet 20 milliGRAM(s) Oral daily  pantoprazole    Tablet 40 milliGRAM(s) Oral at bedtime      REVIEW OF SYSTEMS:  CV: chest pain (-), palpitation (-),   PULM: SOB (-), cough (-), wheezing (-)  CONST: fever (-), chills (-) or fatigue (-)  GI: abdominal distension (-), abdominal pain (-) , nausea/vomiting (-), hematemesis, (-), melena (-), hematochezia (-)  NEURO: numbness (-), weakness (-), dizziness (-)  SKIN: itching (-), rash (-)  HEENT:  visual changes (-); vertigo or throat pain (-);  neck stiffness (-)     All other review of systems is negative unless indicated above.   -------------------------------------------------------------------------------------------  VITALS:  T(C): 36.4 (02-14-25 @ 07:17), Max: 36.8 (02-14-25 @ 05:10)  HR: 60 (02-14-25 @ 07:17) (53 - 66)  BP: 95/60 (02-14-25 @ 07:17) (95/60 - 110/58)  RR: 20 (02-14-25 @ 07:17) (16 - 20)  SpO2: 99% (02-14-25 @ 07:17) (98% - 99%)  Wt(kg): --  I&O's Summary      PHYSICAL EXAM:  GEN: NAD, sitting in bed  HEENT: NCAT  CV: RRR, Ns1/s2  RESP: CTA B/l, no w/r/r  ABD: soft, non-distended, no TTP  EXT: warm, 2+ pulses, no edema  SKIN: no visible lesions, rashes  NEURO: AAOx3  PSYCH: Calm, cooperative    -------------------------------------------------------------------------------------------  LABS:                          13.7   8.54  )-----------( 394      ( 13 Feb 2025 14:58 )             40.1     02-13    138  |  100  |  14  ----------------------------<  100[H]  4.0   |  24  |  0.73    Ca    10.1      13 Feb 2025 14:58  Phos  3.0     02-13  Mg     2.3     02-13    TPro  7.6  /  Alb  4.2  /  TBili  0.4  /  DBili  x   /  AST  16  /  ALT  21  /  AlkPhos  36[L]  02-13

## 2025-02-14 NOTE — CONSULT NOTE ADULT - ASSESSMENT
This is a 44 year old male with a pertinent history of chronic Clostridium difficile infection (dx in Jan. 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 GI consulted for these complaints.     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   Patient is presenting w/ constellation of sx of lower abdominal pain that improves w/ defecation, epigastric cramping, weight loss, dysphagia, and acid reflux. US mesenteric doppler significant for findings suggestive of MALS and SMA syndrome. Patient's epigastric cramping and weight loss can be explained by the US doppler findings. For     Recommendation:  - given mesenteric doppler findings, would recommend surgery c/s for further w/u and mgmt of MALS and SMA syndrome   - please start pantoprazole 40mg BID and famotidine 40mg qhs  - can trial dicyclomine for abdominal cramping that can be 2/2 post-infectious IBS  - f/u barium esophagram  - can consider Bravo pH study outpatient for further w/u of GERD iso relatively normal EGD in 11/2024  - can consider repeat outpatient colonoscopy if above mgmt does not improve lower abdominal pain  - rest of care primary team    All recs are preliminary until attending attestation.    Charles Medrano, PGY-4  Gastroenterology & Hepatology Fellow  Available on TEAMS  Long range pager #: [phone]  Short range pager#: [phone]    For non-urgent consults, please send email to [email or web address] (for [facility]) or [email or web address] (for [facility])   This is a 44 year old male with a pertinent history of chronic Clostridium difficile infection (dx in Jan. 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 GI consulted for these complaints.     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   Patient is presenting w/ constellation of sx of lower abdominal pain that improves w/ defecation, epigastric cramping, weight loss, dysphagia, and acid reflux. US mesenteric doppler significant for findings suggestive of MALS and SMA syndrome. Patient's epigastric cramping and weight loss can be explained by the US doppler findings. For acid reflux and dysphagia, ddx include GERD, esophageal dysmotility, and stricture. 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     Recommendation:  - given mesenteric doppler findings, would recommend surgery c/s for further w/u and mgmt of MALS and SMA syndrome   - please start pantoprazole 40mg BID and famotidine 40mg qhs  - can trial dicyclomine for abdominal cramping that can be 2/2 post-infectious IBS  - f/u barium esophagram  - can consider Bravo pH study outpatient for further w/u of GERD iso relatively normal EGD in 11/2024  - can consider repeat outpatient colonoscopy if above mgmt does not improve lower abdominal pain  - rest of care primary team    All recs are preliminary until attending attestation.    Charles Medrano, PGY-4  Gastroenterology & Hepatology Fellow  Available on TEAMS  Long range pager #: [phone]  Short range pager#: [phone]    For non-urgent consults, please send email to [email or web address] (for [facility]) or [email or web address] (for [facility])

## 2025-02-14 NOTE — DIETITIAN INITIAL EVALUATION ADULT - NSFNSGIASSESSMENTFT_GEN_A_CORE
No N/V reported; zofran ordered. Endorses some reflux.   No diarrhea/constipation reported; last BM 2/11 per pt.

## 2025-02-14 NOTE — DIETITIAN INITIAL EVALUATION ADULT - PERTINENT MEDS FT
MEDICATIONS  (STANDING):  ALPRAZolam 1 milliGRAM(s) Oral two times a day  famotidine    Tablet 20 milliGRAM(s) Oral daily  gabapentin 300 milliGRAM(s) Oral three times a day  influenza   Vaccine 0.5 milliLiter(s) IntraMuscular once  mirtazapine 7.5 milliGRAM(s) Oral at bedtime  pantoprazole    Tablet 40 milliGRAM(s) Oral at bedtime  sertraline 50 milliGRAM(s) Oral daily    MEDICATIONS  (PRN):  acetaminophen     Tablet .. 650 milliGRAM(s) Oral every 6 hours PRN Temp greater or equal to 38C (100.4F), Mild Pain (1 - 3)  aluminum hydroxide/magnesium hydroxide/simethicone Suspension 30 milliLiter(s) Oral every 4 hours PRN Dyspepsia  melatonin 3 milliGRAM(s) Oral at bedtime PRN Insomnia  ondansetron Injectable 4 milliGRAM(s) IV Push every 8 hours PRN Nausea and/or Vomiting

## 2025-02-14 NOTE — CONSULT NOTE ADULT - ATTENDING COMMENTS
Patient seen and examined. Agree w above. 44 year old man with history of recurrent Cdiff in 2023 treated with multiple courses of Dificid and finally resolved with fecal transplant. Patient presenting with upper and lower abdominal pain worst with eating and significant wt loss of ~50 lbs in over a year. Colonoscopy, upper endoscopy, labs and CT unrevealing. Today, US mesenteric doppler w findings suggestive of MALS and compatible w SMA syndrome. Rec surgical evaluation. PPI tx.

## 2025-02-14 NOTE — DIETITIAN INITIAL EVALUATION ADULT - OTHER CALCULATIONS
Used dosing wt for caloric/protein needs in consideration of BMI <16, malnutrition  defer fluid needs to team

## 2025-02-14 NOTE — DIETITIAN INITIAL EVALUATION ADULT - EDUCATION DIETARY MODIFICATIONS
Educated on importance of minimizing intake of foods with known intolerances; reviewed foods considered to be gastric irritants; emphasized limiting high fat, greasy foods to aid in GI tolerance. Encouraged pt to slowly incorporate new foods one at a time and document any signs of intolerance. Educated on the importance of consuming a diet adequate in protein rich food sources; encouraged prioritizing protein foods first at meal times; recommended small, frequent nutrient dense meals. Discussed the benefits of oral nutrition supplementation; recommended having small sips in between meals to optimize protein intake. Pt receptive to diet education and made aware RD remains available upon request./teach back/(2) meets goals/outcomes/verbalization

## 2025-02-14 NOTE — DIETITIAN INITIAL EVALUATION ADULT - COLLABORATION WITH OTHER PROVIDERS
Consider behavioral health consult given concerns for pt's relationship with food as medically appropriate/able

## 2025-02-14 NOTE — CONSULT NOTE ADULT - SUBJECTIVE AND OBJECTIVE BOX
Pt initially diagnosed in Jan 2023 after being prescribed antibiotics for mastoiditis including clindamycin, which subsequently caused him to have recurrent C. diff. He underwent fecal transplant in Dec. 2023. He has had no episodes of C.diff since. He reports having lost over 40 lbs from 170 to 130 lbs over the last year. After the fecal transplant, there has been a severe lower abdominal pain and tightening with meals. He has associated nausea, but denies vomiting, diarrhea, fevers, constipation, inability to pass flatus. Painful events occur on a daily basis since initiating transplant.     Patient has had recent EGD and colonoscopy. Last EGD was in 11/2024 and showed medium HH and gastritis, negative for H pylori and IM. Last colonoscopy was in 7/2023 with internal hemorrhoids but otherwise, unremarkable. Bx negative.     PAST MEDICAL HISTORY: Anxiety        PAST SURGICAL HISTORY:     HOME MEDICATIONS:    ALLERGIES: No Known Allergies      FAMILY HISTORY:     SOCIAL HISTORY:    REVIEW OF SYSTEMS:    VITAL SIGNS:  ICU Vital Signs Last 24 Hrs  T(C): 36.5 (14 Feb 2025 14:07), Max: 36.8 (14 Feb 2025 05:10)  T(F): 97.7 (14 Feb 2025 14:07), Max: 98.2 (14 Feb 2025 05:10)  HR: 59 (14 Feb 2025 14:07) (58 - 66)  BP: 107/66 (14 Feb 2025 14:07) (95/60 - 110/58)  BP(mean): 72 (14 Feb 2025 05:10) (72 - 77)  ABP: --  ABP(mean): --  RR: 18 (14 Feb 2025 14:07) (16 - 20)  SpO2: 100% (14 Feb 2025 14:07) (98% - 100%)    O2 Parameters below as of 14 Feb 2025 14:07  Patient On (Oxygen Delivery Method): room air            PHYSICAL EXAMINATION:  General - no acute distress  Lungs - breathing comfortably on room air   Heart - pulse regular   Abdomen - soft, nontender, nondistended  Extremities - all four extremities are warm    LABS:                          13.7   8.54  )-----------( 394      ( 13 Feb 2025 14:58 )             40.1       02-13    138  |  100  |  14  ----------------------------<  100[H]  4.0   |  24  |  0.73    Ca    10.1      13 Feb 2025 14:58  Phos  3.0     02-13  Mg     2.3     02-13    TPro  7.6  /  Alb  4.2  /  TBili  0.4  /  DBili  x   /  AST  16  /  ALT  21  /  AlkPhos  36[L]  02-13                  RECENT CULTURES:      CAPILLARY BLOOD GLUCOSE          IMAGING STUDIES:  < from: US Doppler Mesenteric (02.14.25 @ 11:54) >  ACC: [id number] EXAM:  US DPLX MESENTERIC   ORDERED BY: DAVID CERNA     PROCEDURE DATE:  02/14/2025          INTERPRETATION:  CLINICAL INFORMATION: Abdominal pain, weight loss,   evaluate for mesenteric ischemia or SMA syndrome.    TECHNIQUE: Sonography of the abdomen was performed with color and   spectral Doppler evaluation of the mesenteric arteries.    COMPARISON: CT abdomen pelvis 2/13/2025.    FINDINGS:    VESSELS:  The distal aorta peak systolic velocity is 92 cm/s. Unremarkable inferior   vena cava.    The celiac trunk is patent with a peak systolic velocity of 184 cm/s on   inspiration that increases up to 250 cm/s with expiration.    The superior mesenteric artery is patent with a peak systolic velocity of   196.9 cm/s.  The Aorto-mesenteric angle measures 13.2 degrees. The aorto-mesenteric   distance measures 2 mm.    The inferior mesenteric artery is patent with a peak systolic velocity of   147.9 cm/s.    VISUALIZED ABDOMINAL VISCERA: Unremarkable partially imaged liver.      IMPRESSION:  *  The aorta, celiac artery, and mesenteric arteries are patent with no   evidence of occlusion or significant stenosis.  *  The celiac artery has a peak systolic velocity of 184 cm/s on   inspiration that increases up to 250 cm/s upon expiration. Findings   suggest median arcuate ligament syndrome.  *  The Aorto-mesenteric angle measures 13.2 degrees. The aorto-mesenteric   distance measures 2 mm., findings compatible with SMA syndrome.    < end of copied text >

## 2025-02-14 NOTE — PROGRESS NOTE ADULT - PROBLEM SELECTOR PLAN 1
R/O mesenteric ischemia and esophageal stricture  Barium esophagram and abd duplex  Had EGD 11/24 s/ gastritis- PPI and Pepcid  Sertraline  Gabapentin- has bottle takes 600mg tid, will start at 300mg tid and uptitrate as needed   Pt wants to check "cancer labs", send tumor markers CEA and , no evidence of malignancy on CT   GI eval

## 2025-02-14 NOTE — DIETITIAN INITIAL EVALUATION ADULT - OTHER INFO
Wt Hx:  - Per chart, dosing wt of 47.6 kg (2/13).   - Wt hx in kg ([facility]) as follows:  RD will continue to monitor weight trends as available/able.   - IBW: 72.7 kg (based on ht of 69 in)  - P/w abdominal pain; GI following   - Hx of EGD (11/24) with gastritis  - Pepcid, PPI, Aluminum Hydroxide; Magnesium Hydroxide; Simethicone Suspension   - Remeron ordered     Wt Hx:  - Recent UBW of 70.5 kg x the past year; reports gradual wt loss due to fluctuating PO intake and intermittent GI tolerance to a BW of 61.4 kg. Noted further wt lossx last few mos with recent wt at 58.2 kg in Nov 2024.   - Per chart, dosing wt of 47.6 kg (2/13).   - Suspected acute wt loss of 18.2% x 4 mos (clinically significant, based on wts from stated Nov 2024 wt and 2/13 wt) with overall wt loss of 32.5% x >/= 1 year (based on UBW and 2/13 wt).  RD will continue to monitor weight trends as available/able.   - IBW: 72.7 kg (based on ht of 69 in)

## 2025-02-14 NOTE — DIETITIAN NUTRITION RISK NOTIFICATION - PHYSICAL ASSESSMENT CLAVICLES
[facility] Progress Note    Subject: Dilma Navarro has been followed in our wound clinic for a calf ulcer.  She goes back and forth from the [facility] to [place] where she has her other home.  She sent us a letter for an update.  She did switch over to chlorophyll complex ointment and developed excellent healing.  She send us a photograph of the wound.  No further follow-up is indicated.    Giovanni Salazar MD on 10/28/2019 at 11:24 AM  [unfilled]    
severe

## 2025-02-14 NOTE — DIETITIAN INITIAL EVALUATION ADULT - ORAL INTAKE PTA/DIET HISTORY
Noted pt with hx of recurrent C diff infection s/p fecal txp. Endorses pt with decreased appetite/PO intake due to reduced GI tolerance x last 4-5 mos. At baseline since txp, pt reports fluctuating appetite/PO intake due to intermittent GI tolerance; pt reports consuming very small meals-snacks throughout day, until the afternoon where he often has a larger appetite likely r/t Remeron timing, where he often consumes a large meal with multiple snacks. Has been trying to slowly incorporate new foods and monitor for GI tolerance; typically avoids high fiber foods. Does endorse he has some concerns that he hasn't been able to increase his caloric intake due to both GI intolerances but also a psychological component due to hx of C diff infection. No micronutrient/protein supplementation noted. Confirms no known food allergies; endorses suspected lactose intolerance. No prior chewing/swallowing difficulties reported.

## 2025-02-14 NOTE — PROGRESS NOTE ADULT - PROBLEM SELECTOR PLAN 4
Regular diet  Keep mg >2  K> 4    D/w wife 2/14 Regular diet  Keep mg >2  K> 4  CT A/P commenting on opacities in lungs however no leukocytosis, fever, no symptoms of cough/dyspnea, monitor off abx especially given hx of cdiff     D/w wife 2/14

## 2025-02-14 NOTE — DIETITIAN INITIAL EVALUATION ADULT - PERTINENT LABORATORY DATA
02-13    138  |  100  |  14  ----------------------------<  100[H]  4.0   |  24  |  0.73    Ca    10.1      13 Feb 2025 14:58  Phos  3.0     02-13  Mg     2.3     02-13    TPro  7.6  /  Alb  4.2  /  TBili  0.4  /  DBili  x   /  AST  16  /  ALT  21  /  AlkPhos  36[L]  02-13

## 2025-02-14 NOTE — DIETITIAN INITIAL EVALUATION ADULT - ENERGY INTAKE
show
- Endorses large appetite in-house, however, pt has been intermittently avoiding meals partially due to concerns that it would induce a GI intolerance event.   - Pt reports consuming a turkey sandwich the other day and was able to tolerate; however, hasn't had any meals since.

## 2025-02-14 NOTE — DIETITIAN NUTRITION RISK NOTIFICATION - UPON NUTRITIONAL ASSESSMENT BY THE REGISTERED DIETITIAN YOUR PATIENT WAS DETERMINED TO MEET CRITERIA/HAS EVIDENCE OF THE FOLLOWING DIAGNOSIS:
Severe protein-calorie malnutrition low risk (no hx of dangerousness, no hospitalization in ten years, adherent to meds, in outpatient treatment, no substance abuse) RF include reported hx of bipolar disorder and stressor of living with sister after breakup
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
## 2025-02-14 NOTE — ED ADULT NURSE REASSESSMENT NOTE - NS ED NURSE REASSESS COMMENT FT1
Patient pending stool sample at this time - patient denies any recent bowel movements since his arrival to the ED.

## 2025-02-14 NOTE — ED ADULT NURSE REASSESSMENT NOTE - NS ED NURSE REASSESS COMMENT FT1
US to RN - "keep pt NPO from 0400 and on for US." Pt made aware of staying NPO after 0400 and pt expressed understanding.

## 2025-02-14 NOTE — CONSULT NOTE ADULT - ASSESSMENT
This is a 44 year old male with a pertinent history of chronic Clostridium difficile infection (dx in Jan. 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 Surgery consulted for MALS/SMA syndrome.    Recommendations:  - no acute surgical intervention indicated  - appreciate GI recs  - nutrition consult  - will follow    discussed with Dr. Patel    Acute Care and Trauma Surgery  [id number]

## 2025-02-14 NOTE — DIETITIAN INITIAL EVALUATION ADULT - ORAL NUTRITION SUPPLEMENTS
Add [employer] Standard 1.0 Protein Shake (provides 16 g PRO, 325 kcal per serving) 2x/day to aid in protein-caloric intake

## 2025-02-14 NOTE — DIETITIAN INITIAL EVALUATION ADULT - PHYSICAL ASSESSMENT TEMPLES
Here with brother for possible strep.  perinanal skin more red, would like swab to rule out strep  Swab obtained  Suzy Lee MD     severe

## 2025-02-14 NOTE — PROGRESS NOTE ADULT - SUBJECTIVE AND OBJECTIVE BOX
Patient is a 44y old  Male who presents with a chief complaint of Abdominal Pain (13 Feb 2025 18:57)      SUBJECTIVE / OVERNIGHT EVENTS: pt still with abdominal pain, poor po intake, no diarrhea, no cp, sob    MEDICATIONS  (STANDING):  ALPRAZolam 1 milliGRAM(s) Oral two times a day  famotidine    Tablet 20 milliGRAM(s) Oral daily  gabapentin 300 milliGRAM(s) Oral three times a day  mirtazapine 7.5 milliGRAM(s) Oral at bedtime  pantoprazole    Tablet 40 milliGRAM(s) Oral at bedtime  sertraline 50 milliGRAM(s) Oral daily    MEDICATIONS  (PRN):  acetaminophen     Tablet .. 650 milliGRAM(s) Oral every 6 hours PRN Temp greater or equal to 38C (100.4F), Mild Pain (1 - 3)  aluminum hydroxide/magnesium hydroxide/simethicone Suspension 30 milliLiter(s) Oral every 4 hours PRN Dyspepsia  melatonin 3 milliGRAM(s) Oral at bedtime PRN Insomnia  ondansetron Injectable 4 milliGRAM(s) IV Push every 8 hours PRN Nausea and/or Vomiting        CAPILLARY BLOOD GLUCOSE        I&O's Summary      PHYSICAL EXAM:  GENERAL: NAD, well-developed  HEAD:  Atraumatic, Normocephalic  EYES: conjunctiva and sclera clear  NECK:  No JVD  CHEST/LUNG: Clear to auscultation bilaterally; No wheeze  HEART: Regular rate and rhythm; No murmurs, rubs, or gallops  ABDOMEN: firm, epigastric ttp , Nondistended; Bowel sounds present  EXTREMITIES:  2+ Peripheral Pulses, No clubbing, cyanosis, or edema  PSYCH: AAOx3      LABS:                        13.7   8.54  )-----------( 394      ( 13 Feb 2025 14:58 )             40.1     02-13    138  |  100  |  14  ----------------------------<  100[H]  4.0   |  24  |  0.73    Ca    10.1      13 Feb 2025 14:58  Phos  3.0     02-13  Mg     2.3     02-13    TPro  7.6  /  Alb  4.2  /  TBili  0.4  /  DBili  x   /  AST  16  /  ALT  21  /  AlkPhos  36[L]  02-13          Urinalysis Basic - ( 13 Feb 2025 14:58 )    Color: x / Appearance: x / SG: x / pH: x  Gluc: 100 mg/dL / Ketone: x  / Bili: x / Urobili: x   Blood: x / Protein: x / Nitrite: x   Leuk Esterase: x / RBC: x / WBC x   Sq Epi: x / Non Sq Epi: x / Bacteria: x        RADIOLOGY & ADDITIONAL TESTS:    Imaging Personally Reviewed:    Consultant(s) Notes Reviewed:      Care Discussed with Consultants/Other Providers:

## 2025-02-14 NOTE — ED ADULT NURSE REASSESSMENT NOTE - NS ED NURSE REASSESS COMMENT FT1
Received patient from RN, patient at A&O3 mental status, able to make needs known, NAD, VSS, patient agreeable to plan of care, pending stool sample, comfort and safety provided.

## 2025-02-15 LAB
ALBUMIN SERPL ELPH-MCNC: 3.5 G/DL — SIGNIFICANT CHANGE UP (ref 3.3–5)
ALP SERPL-CCNC: 32 U/L — LOW (ref 40–120)
ALT FLD-CCNC: 13 U/L — SIGNIFICANT CHANGE UP (ref 10–45)
ANION GAP SERPL CALC-SCNC: 10 MMOL/L — SIGNIFICANT CHANGE UP (ref 5–17)
AST SERPL-CCNC: 12 U/L — SIGNIFICANT CHANGE UP (ref 10–40)
BILIRUB SERPL-MCNC: 0.3 MG/DL — SIGNIFICANT CHANGE UP (ref 0.2–1.2)
BUN SERPL-MCNC: 13 MG/DL — SIGNIFICANT CHANGE UP (ref 7–23)
CALCIUM SERPL-MCNC: 9.2 MG/DL — SIGNIFICANT CHANGE UP (ref 8.4–10.5)
CANCER AG19-9 SERPL-ACNC: 29 U/ML — SIGNIFICANT CHANGE UP
CEA SERPL-MCNC: 1.1 NG/ML — SIGNIFICANT CHANGE UP (ref 0–3.8)
CHLORIDE SERPL-SCNC: 105 MMOL/L — SIGNIFICANT CHANGE UP (ref 96–108)
CO2 SERPL-SCNC: 27 MMOL/L — SIGNIFICANT CHANGE UP (ref 22–31)
CREAT SERPL-MCNC: 0.83 MG/DL — SIGNIFICANT CHANGE UP (ref 0.5–1.3)
EGFR: 111 ML/MIN/1.73M2 — SIGNIFICANT CHANGE UP
GLUCOSE SERPL-MCNC: 112 MG/DL — HIGH (ref 70–99)
HCT VFR BLD CALC: 37.1 % — LOW (ref 39–50)
HGB BLD-MCNC: 12.3 G/DL — LOW (ref 13–17)
MAGNESIUM SERPL-MCNC: 2.4 MG/DL — SIGNIFICANT CHANGE UP (ref 1.6–2.6)
MCHC RBC-ENTMCNC: 28.8 PG — SIGNIFICANT CHANGE UP (ref 27–34)
MCHC RBC-ENTMCNC: 33.2 G/DL — SIGNIFICANT CHANGE UP (ref 32–36)
MCV RBC AUTO: 86.9 FL — SIGNIFICANT CHANGE UP (ref 80–100)
NRBC BLD AUTO-RTO: 0 /100 WBCS — SIGNIFICANT CHANGE UP (ref 0–0)
PHOSPHATE SERPL-MCNC: 3.5 MG/DL — SIGNIFICANT CHANGE UP (ref 2.5–4.5)
PLATELET # BLD AUTO: 358 K/UL — SIGNIFICANT CHANGE UP (ref 150–400)
POTASSIUM SERPL-MCNC: 3.8 MMOL/L — SIGNIFICANT CHANGE UP (ref 3.5–5.3)
POTASSIUM SERPL-SCNC: 3.8 MMOL/L — SIGNIFICANT CHANGE UP (ref 3.5–5.3)
PROT SERPL-MCNC: 6.2 G/DL — SIGNIFICANT CHANGE UP (ref 6–8.3)
RBC # BLD: 4.27 M/UL — SIGNIFICANT CHANGE UP (ref 4.2–5.8)
RBC # FLD: 11.9 % — SIGNIFICANT CHANGE UP (ref 10.3–14.5)
SODIUM SERPL-SCNC: 142 MMOL/L — SIGNIFICANT CHANGE UP (ref 135–145)
WBC # BLD: 4.24 K/UL — SIGNIFICANT CHANGE UP (ref 3.8–10.5)
WBC # FLD AUTO: 4.24 K/UL — SIGNIFICANT CHANGE UP (ref 3.8–10.5)

## 2025-02-15 PROCEDURE — 99232 SBSQ HOSP IP/OBS MODERATE 35: CPT

## 2025-02-15 RX ADMIN — GABAPENTIN 300 MILLIGRAM(S): 400 CAPSULE ORAL at 14:38

## 2025-02-15 RX ADMIN — Medication 1 MILLIGRAM(S): at 14:38

## 2025-02-15 RX ADMIN — GABAPENTIN 300 MILLIGRAM(S): 400 CAPSULE ORAL at 05:05

## 2025-02-15 RX ADMIN — Medication 20 MILLIGRAM(S): at 12:29

## 2025-02-15 RX ADMIN — Medication 1 MILLIGRAM(S): at 07:02

## 2025-02-15 RX ADMIN — MIRTAZAPINE 7.5 MILLIGRAM(S): 30 TABLET, FILM COATED ORAL at 21:57

## 2025-02-15 RX ADMIN — GABAPENTIN 300 MILLIGRAM(S): 400 CAPSULE ORAL at 21:57

## 2025-02-15 RX ADMIN — Medication 1 TABLET(S): at 12:29

## 2025-02-15 RX ADMIN — Medication 2 MILLIGRAM(S): at 21:56

## 2025-02-15 NOTE — PROGRESS NOTE ADULT - SUBJECTIVE AND OBJECTIVE BOX
Patient is seen and examined, Chart is reviewed. admitted with post prandial abdominal pain.     Gen: No fever, chills, weakness  ENT: No visual changes or throat pain  Neck: No pain or stiffness  Respiratory: No cough or wheezing  Cardiovascular: No chest pain or palpitations  Gastrointestinal: ++ abdominal pain, nausea, vomiting, constipation, or diarrhea  Hematologic: No easy bleeding or bruising  Neurologic: No numbness or focal weakness  Psych: No depression or insomnia  Skin: No rash or itching        MEDICATIONS  (STANDING):  ALPRAZolam 1 milliGRAM(s) Oral <User Schedule>  ALPRAZolam 2 milliGRAM(s) Oral at bedtime  famotidine    Tablet 20 milliGRAM(s) Oral daily  gabapentin 300 milliGRAM(s) Oral three times a day  influenza   Vaccine 0.5 milliLiter(s) IntraMuscular once  mirtazapine 7.5 milliGRAM(s) Oral at bedtime  multivitamin 1 Tablet(s) Oral daily  pantoprazole    Tablet 40 milliGRAM(s) Oral at bedtime  sertraline 50 milliGRAM(s) Oral daily    MEDICATIONS  (PRN):  acetaminophen     Tablet .. 650 milliGRAM(s) Oral every 6 hours PRN Temp greater or equal to 38C (100.4F), Mild Pain (1 - 3)  aluminum hydroxide/magnesium hydroxide/simethicone Suspension 30 milliLiter(s) Oral every 4 hours PRN Dyspepsia  melatonin 3 milliGRAM(s) Oral at bedtime PRN Insomnia  ondansetron Injectable 4 milliGRAM(s) IV Push every 8 hours PRN Nausea and/or Vomiting      Vital Signs Last 24 Hrs  T(C): 36.3 (15 Feb 2025 05:07), Max: 37.2 (14 Feb 2025 19:12)  T(F): 97.3 (15 Feb 2025 05:07), Max: 99 (14 Feb 2025 19:12)  HR: 60 (15 Feb 2025 05:07) (59 - 68)  BP: 95/62 (15 Feb 2025 05:07) (95/62 - 107/66)  BP(mean): --  RR: 18 (15 Feb 2025 05:07) (18 - 18)  SpO2: 99% (15 Feb 2025 05:07) (99% - 100%)    Parameters below as of 15 Feb 2025 05:07  Patient On (Oxygen Delivery Method): room air        PHYSICAL EXAM:  GENERAL: NAD, lying in bed comfortably  HEAD:  Atraumatic, Normocephalic  EYES: conjunctiva and sclera clear  ENT: Moist mucous membranes  NECK: Supple, No JVD  CHEST/LUNG: Clear to auscultation bilaterally; No rales, rhonchi, wheezing. Unlabored respirations  HEART: Regular rate and rhythm; No murmurs  ABDOMEN: Bowel sounds present; Soft, Nontender, Nondistended.   EXTREMITIES:  2+ Peripheral Pulses, brisk capillary refill. No clubbing, cyanosis, or edema  NERVOUS SYSTEM:  Alert & Oriented X3, speech clear. No deficits   MSK: FROM all 4 extremities, full and equal strength          LABS:                          12.3   4.24  )-----------( 358      ( 15 Feb 2025 07:07 )             37.1     15 Feb 2025 07:06    142    |  105    |  13     ----------------------------<  112    3.8     |  27     |  0.83     Ca    9.2        15 Feb 2025 07:06  Phos  3.5       15 Feb 2025 07:06  Mg     2.4       15 Feb 2025 07:06    TPro  6.2    /  Alb  3.5    /  TBili  0.3    /  DBili  x      /  AST  12     /  ALT  13     /  AlkPhos  32     15 Feb 2025 07:06    LIVER FUNCTIONS - ( 15 Feb 2025 07:06 )  Alb: 3.5 g/dL / Pro: 6.2 g/dL / ALK PHOS: 32 U/L / ALT: 13 U/L / AST: 12 U/L / GGT: x             CAPILLARY BLOOD GLUCOSE            Urinalysis Basic - ( 15 Feb 2025 07:06 )    Color: x / Appearance: x / SG: x / pH: x  Gluc: 112 mg/dL / Ketone: x  / Bili: x / Urobili: x   Blood: x / Protein: x / Nitrite: x   Leuk Esterase: x / RBC: x / WBC x   Sq Epi: x / Non Sq Epi: x / Bacteria: x        RADIOLOGY:

## 2025-02-15 NOTE — PROGRESS NOTE ADULT - PROBLEM SELECTOR PLAN 4
Regular diet  Keep mg >2  K> 4  CT A/P commenting on opacities in lungs however no leukocytosis, fever, no symptoms of cough/dyspnea, monitor off abx especially given hx of cdiff     D/w wife 2/14

## 2025-02-15 NOTE — PROGRESS NOTE ADULT - PROBLEM SELECTOR PLAN 1
R/O mesenteric ischemia and esophageal stricture  Barium esophagram and abd duplex  Had EGD 11/24 s/ gastritis- PPI and Pepcid  Sertraline  Gabapentin- has bottle takes 600mg tid, will start at 300mg tid and uptitrate as needed   Pt wants to check "cancer labs", send tumor markers CEA and , no evidence of malignancy on CT   GI eval done  possible SMA syndrome or MALS  follow surgical consult

## 2025-02-16 LAB — GI PCR PANEL: SIGNIFICANT CHANGE UP

## 2025-02-16 PROCEDURE — 99232 SBSQ HOSP IP/OBS MODERATE 35: CPT

## 2025-02-16 RX ADMIN — Medication 1 MILLIGRAM(S): at 06:37

## 2025-02-16 RX ADMIN — Medication 20 MILLIGRAM(S): at 10:53

## 2025-02-16 RX ADMIN — Medication 650 MILLIGRAM(S): at 00:02

## 2025-02-16 RX ADMIN — GABAPENTIN 300 MILLIGRAM(S): 400 CAPSULE ORAL at 13:55

## 2025-02-16 RX ADMIN — Medication 2 MILLIGRAM(S): at 21:06

## 2025-02-16 RX ADMIN — Medication 650 MILLIGRAM(S): at 01:02

## 2025-02-16 RX ADMIN — GABAPENTIN 300 MILLIGRAM(S): 400 CAPSULE ORAL at 06:37

## 2025-02-16 RX ADMIN — MIRTAZAPINE 7.5 MILLIGRAM(S): 30 TABLET, FILM COATED ORAL at 22:30

## 2025-02-16 RX ADMIN — GABAPENTIN 300 MILLIGRAM(S): 400 CAPSULE ORAL at 21:05

## 2025-02-16 RX ADMIN — Medication 650 MILLIGRAM(S): at 15:42

## 2025-02-16 RX ADMIN — Medication 1 MILLIGRAM(S): at 13:55

## 2025-02-16 RX ADMIN — Medication 1 TABLET(S): at 10:53

## 2025-02-16 NOTE — PROGRESS NOTE ADULT - SUBJECTIVE AND OBJECTIVE BOX
Patient is seen and examined, Chart is reviewed. Able to tolerate diet.      Gen: No fever, chills, weakness  ENT: No visual changes or throat pain  Neck: No pain or stiffness  Respiratory: No cough or wheezing  Cardiovascular: No chest pain or palpitations  Gastrointestinal: No abdominal pain, nausea, vomiting, constipation, or diarrhea  Hematologic: No easy bleeding or bruising  Neurologic: No numbness or focal weakness  Psych: No depression or insomnia  Skin: No rash or itching        MEDICATIONS  (STANDING):  ALPRAZolam 1 milliGRAM(s) Oral <User Schedule>  ALPRAZolam 2 milliGRAM(s) Oral at bedtime  famotidine    Tablet 20 milliGRAM(s) Oral daily  gabapentin 300 milliGRAM(s) Oral three times a day  influenza   Vaccine 0.5 milliLiter(s) IntraMuscular once  mirtazapine 7.5 milliGRAM(s) Oral at bedtime  multivitamin 1 Tablet(s) Oral daily  pantoprazole    Tablet 40 milliGRAM(s) Oral at bedtime  sertraline 50 milliGRAM(s) Oral daily    MEDICATIONS  (PRN):  acetaminophen     Tablet .. 650 milliGRAM(s) Oral every 6 hours PRN Temp greater or equal to 38C (100.4F), Mild Pain (1 - 3)  aluminum hydroxide/magnesium hydroxide/simethicone Suspension 30 milliLiter(s) Oral every 4 hours PRN Dyspepsia  melatonin 3 milliGRAM(s) Oral at bedtime PRN Insomnia  ondansetron Injectable 4 milliGRAM(s) IV Push every 8 hours PRN Nausea and/or Vomiting      Vital Signs Last 24 Hrs  T(C): 36.7 (16 Feb 2025 11:25), Max: 36.7 (15 Feb 2025 20:51)  T(F): 98.1 (16 Feb 2025 11:25), Max: 98.1 (15 Feb 2025 20:51)  HR: 75 (16 Feb 2025 11:25) (62 - 92)  BP: 110/73 (16 Feb 2025 11:25) (105/69 - 111/68)  BP(mean): --  RR: 18 (16 Feb 2025 11:25) (18 - 18)  SpO2: 98% (16 Feb 2025 11:25) (95% - 98%)    Parameters below as of 16 Feb 2025 11:25  Patient On (Oxygen Delivery Method): room air        PHYSICAL EXAM:  GENERAL: NAD, lying in bed comfortably  HEAD:  Atraumatic, Normocephalic  EYES: conjunctiva and sclera clear  ENT: Moist mucous membranes  NECK: Supple, No JVD  CHEST/LUNG: Clear to auscultation bilaterally; No rales, rhonchi, wheezing. Unlabored respirations  HEART: Regular rate and rhythm; No murmurs  ABDOMEN: Bowel sounds present; Soft, Nontender, Nondistended.   EXTREMITIES:  2+ Peripheral Pulses, brisk capillary refill. No clubbing, cyanosis, or edema  NERVOUS SYSTEM:  Alert & Oriented X3, speech clear. No deficits   MSK: FROM all 4 extremities, full and equal strength          LABS:                          12.3   4.24  )-----------( 358      ( 15 Feb 2025 07:07 )             37.1     15 Feb 2025 07:06    142    |  105    |  13     ----------------------------<  112    3.8     |  27     |  0.83     Ca    9.2        15 Feb 2025 07:06  Phos  3.5       15 Feb 2025 07:06  Mg     2.4       15 Feb 2025 07:06    TPro  6.2    /  Alb  3.5    /  TBili  0.3    /  DBili  x      /  AST  12     /  ALT  13     /  AlkPhos  32     15 Feb 2025 07:06    LIVER FUNCTIONS - ( 15 Feb 2025 07:06 )  Alb: 3.5 g/dL / Pro: 6.2 g/dL / ALK PHOS: 32 U/L / ALT: 13 U/L / AST: 12 U/L / GGT: x             CAPILLARY BLOOD GLUCOSE            Urinalysis Basic - ( 15 Feb 2025 07:06 )    Color: x / Appearance: x / SG: x / pH: x  Gluc: 112 mg/dL / Ketone: x  / Bili: x / Urobili: x   Blood: x / Protein: x / Nitrite: x   Leuk Esterase: x / RBC: x / WBC x   Sq Epi: x / Non Sq Epi: x / Bacteria: x        RADIOLOGY:

## 2025-02-17 PROCEDURE — 99233 SBSQ HOSP IP/OBS HIGH 50: CPT

## 2025-02-17 RX ADMIN — Medication 20 MILLIGRAM(S): at 11:04

## 2025-02-17 RX ADMIN — MIRTAZAPINE 7.5 MILLIGRAM(S): 30 TABLET, FILM COATED ORAL at 22:27

## 2025-02-17 RX ADMIN — Medication 20 MILLIGRAM(S): at 13:24

## 2025-02-17 RX ADMIN — Medication 1 TABLET(S): at 11:04

## 2025-02-17 RX ADMIN — GABAPENTIN 300 MILLIGRAM(S): 400 CAPSULE ORAL at 06:22

## 2025-02-17 RX ADMIN — GABAPENTIN 300 MILLIGRAM(S): 400 CAPSULE ORAL at 21:14

## 2025-02-17 RX ADMIN — Medication 1 MILLIGRAM(S): at 06:22

## 2025-02-17 RX ADMIN — Medication 2 MILLIGRAM(S): at 21:14

## 2025-02-17 RX ADMIN — Medication 1 MILLIGRAM(S): at 13:00

## 2025-02-17 RX ADMIN — GABAPENTIN 300 MILLIGRAM(S): 400 CAPSULE ORAL at 13:01

## 2025-02-17 NOTE — PROGRESS NOTE ADULT - PROBLEM SELECTOR PLAN 4
Regular diet  Keep mg >2  K> 4  CT A/P commenting on opacities in lungs however no leukocytosis, fever, no symptoms of cough/dyspnea, monitor off abx especially given hx of cdiff     D/w wife 2/15

## 2025-02-17 NOTE — PROGRESS NOTE ADULT - NSPROGADDITIONALINFOA_GEN_ALL_CORE
time spent reviewing prior charts, meds, discussing plan with patient= 50 min     d/w Medicine ACP Zeta

## 2025-02-17 NOTE — PROGRESS NOTE ADULT - SUBJECTIVE AND OBJECTIVE BOX
Patient is a 44y old  Male who presents with a chief complaint of Abdominal Pain (16 Feb 2025 13:05)      SUBJECTIVE / OVERNIGHT EVENTS: Patient seen and examined at bedside. he is tolerating food well, intermittent abd pain. no events overnight.     ROS:  All other review of systems negative    Allergies    No Known Allergies    Intolerances        MEDICATIONS  (STANDING):  ALPRAZolam 1 milliGRAM(s) Oral <User Schedule>  ALPRAZolam 2 milliGRAM(s) Oral at bedtime  Beano Supplement Capsule 1 Capsule(s) 1 Capsule(s) Oral three times a day before meals  gabapentin 300 milliGRAM(s) Oral three times a day  influenza   Vaccine 0.5 milliLiter(s) IntraMuscular once  mirtazapine 7.5 milliGRAM(s) Oral at bedtime  multivitamin 1 Tablet(s) Oral daily  pantoprazole    Tablet 40 milliGRAM(s) Oral at bedtime  sertraline 50 milliGRAM(s) Oral daily    MEDICATIONS  (PRN):  acetaminophen     Tablet .. 650 milliGRAM(s) Oral every 6 hours PRN Temp greater or equal to 38C (100.4F), Mild Pain (1 - 3)  aluminum hydroxide/magnesium hydroxide/simethicone Suspension 30 milliLiter(s) Oral every 4 hours PRN Dyspepsia  melatonin 3 milliGRAM(s) Oral at bedtime PRN Insomnia  ondansetron Injectable 4 milliGRAM(s) IV Push every 8 hours PRN Nausea and/or Vomiting      Vital Signs Last 24 Hrs  T(C): 36.4 (17 Feb 2025 10:55), Max: 36.6 (16 Feb 2025 20:25)  T(F): 97.6 (17 Feb 2025 10:55), Max: 97.9 (16 Feb 2025 20:25)  HR: 78 (17 Feb 2025 10:55) (70 - 78)  BP: 97/63 (17 Feb 2025 10:55) (95/62 - 99/66)  BP(mean): --  RR: 18 (17 Feb 2025 10:55) (18 - 18)  SpO2: 95% (17 Feb 2025 10:55) (95% - 100%)    Parameters below as of 17 Feb 2025 10:55  Patient On (Oxygen Delivery Method): room air      CAPILLARY BLOOD GLUCOSE        I&O's Summary    16 Feb 2025 07:01  -  17 Feb 2025 07:00  --------------------------------------------------------  IN: 480 mL / OUT: 0 mL / NET: 480 mL    17 Feb 2025 07:01  -  17 Feb 2025 17:52  --------------------------------------------------------  IN: 240 mL / OUT: 0 mL / NET: 240 mL        PHYSICAL EXAM:  GENERAL: cachectic   HEAD:  Atraumatic, Normocephalic  EYES: EOMI, PERRLA, conjunctiva and sclera clear  NECK: Supple, No JVD  CHEST/LUNG: Clear to auscultation bilaterally; No wheeze  HEART: Regular rate and rhythm; No murmurs, rubs, or gallops  ABDOMEN: Soft, Nontender, Nondistended; Bowel sounds present  EXTREMITIES:  2+ Peripheral Pulses, No clubbing, cyanosis, or edema  NEUROLOGY: AAOx3, non-focal      Consultant(s) Notes Reviewed:  GI and Surgery     Case Discussed with spouse over the phone

## 2025-02-18 PROCEDURE — 99232 SBSQ HOSP IP/OBS MODERATE 35: CPT | Mod: GC

## 2025-02-18 PROCEDURE — 74221 X-RAY XM ESOPHAGUS 2CNTRST: CPT | Mod: 26

## 2025-02-18 PROCEDURE — 99233 SBSQ HOSP IP/OBS HIGH 50: CPT

## 2025-02-18 RX ORDER — TAMSULOSIN HYDROCHLORIDE 0.4 MG/1
0.4 CAPSULE ORAL AT BEDTIME
Refills: 0 | Status: DISCONTINUED | OUTPATIENT
Start: 2025-02-18 | End: 2025-02-21

## 2025-02-18 RX ADMIN — Medication 2 MILLIGRAM(S): at 21:41

## 2025-02-18 RX ADMIN — Medication 1 MILLIGRAM(S): at 06:28

## 2025-02-18 RX ADMIN — Medication 10 MILLIGRAM(S): at 17:39

## 2025-02-18 RX ADMIN — GABAPENTIN 300 MILLIGRAM(S): 400 CAPSULE ORAL at 06:28

## 2025-02-18 RX ADMIN — Medication 40 MILLIGRAM(S): at 14:45

## 2025-02-18 RX ADMIN — GABAPENTIN 300 MILLIGRAM(S): 400 CAPSULE ORAL at 14:43

## 2025-02-18 RX ADMIN — TAMSULOSIN HYDROCHLORIDE 0.4 MILLIGRAM(S): 0.4 CAPSULE ORAL at 21:41

## 2025-02-18 RX ADMIN — MIRTAZAPINE 7.5 MILLIGRAM(S): 30 TABLET, FILM COATED ORAL at 21:41

## 2025-02-18 RX ADMIN — GABAPENTIN 300 MILLIGRAM(S): 400 CAPSULE ORAL at 21:40

## 2025-02-18 RX ADMIN — Medication 1 MILLIGRAM(S): at 14:43

## 2025-02-18 RX ADMIN — Medication 1 TABLET(S): at 14:43

## 2025-02-18 NOTE — PROGRESS NOTE ADULT - SUBJECTIVE AND OBJECTIVE BOX
SUBJECTIVE:  Reports continued mild-> moderate lower abd pain  Denies nausea, vomiting  Is passing gas and having bowel movements, denies diarrhea  Tolerating diet  Ambulating independently    OBJECTIVE:  Vital Signs Last 24 Hrs  T(C): 36.3 (18 Feb 2025 04:30), Max: 36.9 (17 Feb 2025 19:10)  T(F): 97.4 (18 Feb 2025 04:30), Max: 98.4 (17 Feb 2025 19:10)  HR: 82 (18 Feb 2025 04:30) (78 - 96)  BP: 106/69 (18 Feb 2025 04:30) (97/63 - 106/69)  BP(mean): --  RR: 18 (18 Feb 2025 04:30) (18 - 20)  SpO2: 98% (18 Feb 2025 04:30) (95% - 98%)    Parameters below as of 18 Feb 2025 04:30  Patient On (Oxygen Delivery Method): room air      Physical Examination:  GEN: NAD, resting quietly  NEURO: AAOx3, CN II-XII grossly intact, no focal deficits  PULM: symmetric chest rise bilaterally, no increased WOB  ABD: soft, nontender, nondistended, incision CDI  EXTR: no lower extremity edema, moving all extremities      IMAGING:  [< from: US Doppler Mesenteric (02.14.25 @ 11:54) >  FINDINGS:    VESSELS:  The distal aorta peak systolic velocity is 92 cm/s. Unremarkable inferior   vena cava.    The celiac trunk is patent with a peak systolic velocity of 184 cm/s on   inspiration that increases up to 250 cm/s with expiration.    The superior mesenteric artery is patent with a peak systolic velocity of   196.9 cm/s.  The Aorto-mesenteric angle measures 13.2 degrees. The aorto-mesenteric   distance measures 2 mm.    The inferior mesenteric artery is patent with a peak systolic velocity of   147.9 cm/s.    VISUALIZED ABDOMINAL VISCERA: Unremarkable partially imaged liver.      IMPRESSION:  *  The aorta, celiac artery, and mesenteric arteries are patent with no   evidence of occlusion or significant stenosis.  *  The celiac artery has a peak systolic velocity of 184 cm/s on   inspiration that increases up to 250 cm/s upon expiration. Findings   suggest median arcuate ligament syndrome.  *  The Aorto-mesenteric angle measures 13.2 degrees. The aorto-mesenteric   distance measures 2 mm., findings compatible with SMA syndrome.    < end of copied text >  ]       SUBJECTIVE:  Reports continued mild-> moderate lower abd pain  Denies nausea, vomiting  Is passing gas and having bowel movements, denies diarrhea  Tolerating diet  Ambulating independently    OBJECTIVE:  Vital Signs Last 24 Hrs  T(C): 36.3 (18 Feb 2025 04:30), Max: 36.9 (17 Feb 2025 19:10)  T(F): 97.4 (18 Feb 2025 04:30), Max: 98.4 (17 Feb 2025 19:10)  HR: 82 (18 Feb 2025 04:30) (78 - 96)  BP: 106/69 (18 Feb 2025 04:30) (97/63 - 106/69)  BP(mean): --  RR: 18 (18 Feb 2025 04:30) (18 - 20)  SpO2: 98% (18 Feb 2025 04:30) (95% - 98%)    Parameters below as of 18 Feb 2025 04:30  Patient On (Oxygen Delivery Method): room air      Physical Examination:  GEN: NAD, resting quietly  NEURO: AAOx3, CN II-XII grossly intact, no focal deficits  PULM: symmetric chest rise bilaterally, no increased WOB  ABD: soft, mild tenderness to palpation in the bilateral lower quadrants, nondistended  EXTR: no lower extremity edema, moving all extremities      IMAGING:  [< from: US Doppler Mesenteric (02.14.25 @ 11:54) >  FINDINGS:    VESSELS:  The distal aorta peak systolic velocity is 92 cm/s. Unremarkable inferior   vena cava.    The celiac trunk is patent with a peak systolic velocity of 184 cm/s on   inspiration that increases up to 250 cm/s with expiration.    The superior mesenteric artery is patent with a peak systolic velocity of   196.9 cm/s.  The Aorto-mesenteric angle measures 13.2 degrees. The aorto-mesenteric   distance measures 2 mm.    The inferior mesenteric artery is patent with a peak systolic velocity of   147.9 cm/s.    VISUALIZED ABDOMINAL VISCERA: Unremarkable partially imaged liver.      IMPRESSION:  *  The aorta, celiac artery, and mesenteric arteries are patent with no   evidence of occlusion or significant stenosis.  *  The celiac artery has a peak systolic velocity of 184 cm/s on   inspiration that increases up to 250 cm/s upon expiration. Findings   suggest median arcuate ligament syndrome.  *  The Aorto-mesenteric angle measures 13.2 degrees. The aorto-mesenteric   distance measures 2 mm., findings compatible with SMA syndrome.    < end of copied text >  ]

## 2025-02-18 NOTE — PROGRESS NOTE ADULT - PROBLEM SELECTOR PLAN 1
had EGD 11/24 showing gastritis - c/w pantoprazole 40mg BID and pepcid daily  - CT abd/pelv with no acute pathology  - US doppler mesenteric concerning for SMA syndrome or MALS  - f/u barium esophagram to r/o esophageal stricture  - c/w sertraline 50mg daily and gabapentin 300mg TID  - add bentyl 10mg TID w/ meals  - GI and Surgery consult recs appreciated  - Per Surgery, SMA syndrome or MALS no intervention inpatient nor are there any MALS specialists in [facility]  - pt to f/u Dr. Noriega in [place] outpatient - for initial consult with Dr. Noriega, will need CT Angio abd/pelv performed and all imaging on CDs prior to d/c

## 2025-02-18 NOTE — PROGRESS NOTE ADULT - SUBJECTIVE AND OBJECTIVE BOX
Arelis Perez MD  Division of Hospital Medicine  [facility]   Available on [employer] Teams (Mon-Fri 8am-5pm)    * messages preferred prior to calls  Other Times:  [phone]      Patient is a 44y old  Male who presents with a chief complaint of Abdominal Pain (18 Feb 2025 14:12)      SUBJECTIVE / OVERNIGHT EVENTS: no acute events overnight. but still with ongoing lower abdominal cramping and difficulty voiding (bladder scan negative for retention). concerned as he felt he had worsening spasm pain with bentyl in the past but this was in when he had active c. diff infection and colitis. amenable to try again.  ADDITIONAL REVIEW OF SYSTEMS:    MEDICATIONS  (STANDING):  ALPRAZolam 1 milliGRAM(s) Oral <User Schedule>  ALPRAZolam 2 milliGRAM(s) Oral at bedtime  Beano Supplement Capsule 1 Capsule(s) 1 Capsule(s) Oral three times a day before meals  dicyclomine 10 milliGRAM(s) Oral three times a day before meals  famotidine    Tablet 40 milliGRAM(s) Oral daily  gabapentin 300 milliGRAM(s) Oral three times a day  influenza   Vaccine 0.5 milliLiter(s) IntraMuscular once  mirtazapine 7.5 milliGRAM(s) Oral at bedtime  multivitamin 1 Tablet(s) Oral daily  pantoprazole    Tablet 40 milliGRAM(s) Oral two times a day  sertraline 50 milliGRAM(s) Oral daily  tamsulosin 0.4 milliGRAM(s) Oral at bedtime    MEDICATIONS  (PRN):  acetaminophen     Tablet .. 650 milliGRAM(s) Oral every 6 hours PRN Temp greater or equal to 38C (100.4F), Mild Pain (1 - 3)  aluminum hydroxide/magnesium hydroxide/simethicone Suspension 30 milliLiter(s) Oral every 4 hours PRN Dyspepsia  melatonin 3 milliGRAM(s) Oral at bedtime PRN Insomnia  ondansetron Injectable 4 milliGRAM(s) IV Push every 8 hours PRN Nausea and/or Vomiting      CAPILLARY BLOOD GLUCOSE        I&O's Summary    17 Feb 2025 07:01  -  18 Feb 2025 07:00  --------------------------------------------------------  IN: 480 mL / OUT: 0 mL / NET: 480 mL        PHYSICAL EXAM:  Vital Signs Last 24 Hrs  T(C): 36.3 (18 Feb 2025 08:57), Max: 36.9 (17 Feb 2025 19:10)  T(F): 97.4 (18 Feb 2025 08:57), Max: 98.4 (17 Feb 2025 19:10)  HR: 70 (18 Feb 2025 08:57) (70 - 96)  BP: 100/62 (18 Feb 2025 08:57) (100/62 - 106/69)  BP(mean): --  RR: 18 (18 Feb 2025 08:57) (18 - 20)  SpO2: 99% (18 Feb 2025 08:57) (98% - 99%)    Parameters below as of 18 Feb 2025 08:57  Patient On (Oxygen Delivery Method): room air    CONSTITUTIONAL: thin, emaciated male in NAD  EYES: PERRLA; conjunctiva and sclera clear  ENMT: Moist oral mucosa, no pharyngeal injection or exudates; normal dentition  NECK: Supple, no palpable masses; no thyromegaly  RESPIRATORY: Normal respiratory effort; lungs are clear to auscultation bilaterally  CARDIOVASCULAR: Regular rate and rhythm, normal S1 and S2, no murmur/rub/gallop; No lower extremity edema; Peripheral pulses are 2+ bilaterally  ABDOMEN: Soft, Nondistended, +tender to deep palpation in lower quadrants, normoactive bowel sounds  MUSCULOSKELETAL: No clubbing or cyanosis of digits; no joint swelling or tenderness to palpation  PSYCH: A+O to person, place, and time; affect appropriate, anxious appearing  NEUROLOGY: CN 2-12 are intact and symmetric; no gross sensory deficits   SKIN: No rashes; no palpable lesions    LABS:        RADIOLOGY & ADDITIONAL TESTS:  Results Reviewed:   Imaging Personally Reviewed:  Electrocardiogram Personally Reviewed:    COORDINATION OF CARE:  Care Discussed with Consultants/Other Providers [Y]: medicine ROBYN Shelton  Prior or Outpatient Records Reviewed [Y/N]:

## 2025-02-18 NOTE — PROGRESS NOTE ADULT - NSPROGADDITIONALINFOA_GEN_ALL_CORE
.  Arelis Perez MD  Division of Hospital Medicine  [facility]   Available on Microsoft Teams - messages preferred prior to calls.    Plan discussed with patient, wife Marguerite on speakerphone bedside, and medicine NP Nikita. .  Arelis Perez MD  Division of Hospital Medicine  [facility]   Available on Microsoft Teams - messages preferred prior to calls.    Anticipate discharge home tomorrow. Will need all imaging made into CDs for him to take with him which he will need to mail to Dr. Noriega in order to have initial consult.    Plan discussed with patient, wife Marguerite on speakerphone bedside, and medicine NP Nikita.

## 2025-02-18 NOTE — PROGRESS NOTE ADULT - PROBLEM SELECTOR PLAN 4
Diet: Regular    CT A/P commenting on opacities in lungs however no leukocytosis, fever, no symptoms of cough/dyspnea, monitor off abx especially given hx of cdiff

## 2025-02-18 NOTE — PROGRESS NOTE ADULT - SUBJECTIVE AND OBJECTIVE BOX
Interval Events:   -Still w/ lower abdominal pain  -Pending read of esophagram    Hospital Medications:  acetaminophen     Tablet .. 650 milliGRAM(s) Oral every 6 hours PRN  ALPRAZolam 1 milliGRAM(s) Oral <User Schedule>  ALPRAZolam 2 milliGRAM(s) Oral at bedtime  aluminum hydroxide/magnesium hydroxide/simethicone Suspension 30 milliLiter(s) Oral every 4 hours PRN  Beano Supplement Capsule 1 Capsule(s) 1 Capsule(s) Oral three times a day before meals  dicyclomine 10 milliGRAM(s) Oral three times a day before meals  famotidine    Tablet 40 milliGRAM(s) Oral daily  gabapentin 300 milliGRAM(s) Oral three times a day  influenza   Vaccine 0.5 milliLiter(s) IntraMuscular once  melatonin 3 milliGRAM(s) Oral at bedtime PRN  mirtazapine 7.5 milliGRAM(s) Oral at bedtime  multivitamin 1 Tablet(s) Oral daily  ondansetron Injectable 4 milliGRAM(s) IV Push every 8 hours PRN  pantoprazole    Tablet 40 milliGRAM(s) Oral two times a day  sertraline 50 milliGRAM(s) Oral daily  tamsulosin 0.4 milliGRAM(s) Oral at bedtime            25 @ 07:01  -  25 @ 07:00  --------------------------------------------------------  IN: 480 mL / OUT: 0 mL / NET: 480 mL          PHYSICAL EXAM:   Vital Signs:  Vital Signs Last 24 Hrs  T(C): 36.3 (2025 08:57), Max: 36.9 (2025 19:10)  T(F): 97.4 (2025 08:57), Max: 98.4 (2025 19:10)  HR: 70 (2025 08:57) (70 - 96)  BP: 100/62 (2025 08:57) (100/62 - 106/69)  BP(mean): --  RR: 18 (2025 08:57) (18 - 20)  SpO2: 99% (2025 08:57) (98% - 99%)    Parameters below as of 2025 08:57  Patient On (Oxygen Delivery Method): room air      Daily     Daily   GENERAL:  Cachectic, non toxic  HEENT:  NC/AT,  conjunctivae clear and pink, sclera -anicteric  CHEST:  Normal Effort, no signs of resp distress  HEART:  RRR, HD stable  ABDOMEN:  Soft, non-tender, non-distended  EXTREMITIES:  no cyanosis or edema  SKIN:  Warm & Dry. No rash or erythema  NEURO:  Alert, oriented, no focal deficit  LABS:    Last Hb:Hemoglobin: 12.3 g/dL (02-15-25 @ 07:07)               142   |  105   |  13                 Ca: 9.2    BMP:   ----------------------------< 112    M.4   (02-15-25 @ 07:06)             3.8    |  27    | 0.83               Ph: 3.5      LFT:     TPro: 6.2 / Alb: 3.5 / TBili: 0.3 / DBili: x / AST: 12 / ALT: 13 / AlkPhos: 32   (02-15-25 @ 07:06)    Creatinine: 0.83 mg/dL

## 2025-02-19 PROCEDURE — 99232 SBSQ HOSP IP/OBS MODERATE 35: CPT

## 2025-02-19 PROCEDURE — 74018 RADEX ABDOMEN 1 VIEW: CPT | Mod: 26

## 2025-02-19 RX ADMIN — Medication 10 MILLIGRAM(S): at 12:44

## 2025-02-19 RX ADMIN — Medication 2 MILLIGRAM(S): at 21:46

## 2025-02-19 RX ADMIN — MIRTAZAPINE 7.5 MILLIGRAM(S): 30 TABLET, FILM COATED ORAL at 22:50

## 2025-02-19 RX ADMIN — Medication 10 MILLIGRAM(S): at 06:14

## 2025-02-19 RX ADMIN — Medication 1 TABLET(S): at 12:44

## 2025-02-19 RX ADMIN — TAMSULOSIN HYDROCHLORIDE 0.4 MILLIGRAM(S): 0.4 CAPSULE ORAL at 21:46

## 2025-02-19 RX ADMIN — Medication 40 MILLIGRAM(S): at 13:16

## 2025-02-19 RX ADMIN — Medication 1 MILLIGRAM(S): at 13:55

## 2025-02-19 RX ADMIN — GABAPENTIN 300 MILLIGRAM(S): 400 CAPSULE ORAL at 21:46

## 2025-02-19 RX ADMIN — GABAPENTIN 300 MILLIGRAM(S): 400 CAPSULE ORAL at 13:56

## 2025-02-19 RX ADMIN — GABAPENTIN 300 MILLIGRAM(S): 400 CAPSULE ORAL at 06:14

## 2025-02-19 RX ADMIN — Medication 10 MILLIGRAM(S): at 17:31

## 2025-02-19 RX ADMIN — Medication 1 MILLIGRAM(S): at 06:14

## 2025-02-19 NOTE — PROGRESS NOTE ADULT - PROBLEM SELECTOR PLAN 1
had EGD 11/24 showing gastritis - c/w pantoprazole 40mg BID and pepcid daily  - CT abd/pelv with no acute pathology  - US doppler mesenteric concerning for SMA syndrome or MALS  - barium esophagram without acute pathology  - c/w sertraline 50mg daily and gabapentin 300mg TID  - c/w bentyl 10mg TID w/ meals - pain improved  - GI and Surgery consult recs appreciated  - Per Surgery, SMA syndrome or MALS no intervention inpatient nor are there any MALS specialists in [facility]  - pt to f/u Dr. Noriega in [place] outpatient - for initial consult with Dr. Noriega, will need CT Angio abd/pelv performed and all imaging on CDs prior to d/c  - f/u CT Angio abd/pelv - pending clearance of contrast from barium esophagram

## 2025-02-19 NOTE — PROGRESS NOTE ADULT - SUBJECTIVE AND OBJECTIVE BOX
SUBJECTIVE:  Patient continues to feel well, CT scan last night for Dr. Noriega at [place] performed.  No nausea/vomiting  continues to gain weight and tolerate food    OBJECTIVE:  Vital Signs Last 24 Hrs  T(C): 36.4 (19 Feb 2025 06:00), Max: 36.7 (18 Feb 2025 19:09)  T(F): 97.5 (19 Feb 2025 06:00), Max: 98 (18 Feb 2025 19:09)  HR: 64 (19 Feb 2025 06:00) (64 - 74)  BP: 100/63 (19 Feb 2025 06:00) (100/62 - 100/65)  BP(mean): --  RR: 15 (19 Feb 2025 06:00) (15 - 18)  SpO2: 98% (19 Feb 2025 06:00) (98% - 99%)    Parameters below as of 19 Feb 2025 06:00  Patient On (Oxygen Delivery Method): room air        Physical Examination:  GEN: NAD, resting quietly  NEURO: AAOx3, CN II-XII grossly intact, no focal deficits  PULM: symmetric chest rise bilaterally, no increased WOB  ABD: soft, mild tenderness to palpation in the bilateral lower quadrants, nondistended  EXTR: no lower extremity edema, moving all extremities

## 2025-02-19 NOTE — PROGRESS NOTE ADULT - SUBJECTIVE AND OBJECTIVE BOX
Arelis Perez MD  Division of Hospital Medicine  [facility]   Available on Microsoft Teams (Mon-Fri 8am-5pm)    * messages preferred prior to calls  Other Times:  [phone]      Patient is a 44y old  Male who presents with a chief complaint of Abdominal Pain (19 Feb 2025 07:14)      SUBJECTIVE / OVERNIGHT EVENTS: no acute events overnight. abd pain improved with addition of bentyl. eating lunch with no issues at time of my exam.  ADDITIONAL REVIEW OF SYSTEMS:    MEDICATIONS  (STANDING):  ALPRAZolam 1 milliGRAM(s) Oral <User Schedule>  ALPRAZolam 2 milliGRAM(s) Oral at bedtime  Beano Supplement Capsule 1 Capsule(s) 1 Capsule(s) Oral three times a day before meals  dicyclomine 10 milliGRAM(s) Oral three times a day before meals  famotidine    Tablet 40 milliGRAM(s) Oral daily  gabapentin 300 milliGRAM(s) Oral three times a day  influenza   Vaccine 0.5 milliLiter(s) IntraMuscular once  mirtazapine 7.5 milliGRAM(s) Oral at bedtime  multivitamin 1 Tablet(s) Oral daily  pantoprazole    Tablet 40 milliGRAM(s) Oral two times a day  sertraline 50 milliGRAM(s) Oral daily  tamsulosin 0.4 milliGRAM(s) Oral at bedtime    MEDICATIONS  (PRN):  acetaminophen     Tablet .. 650 milliGRAM(s) Oral every 6 hours PRN Temp greater or equal to 38C (100.4F), Mild Pain (1 - 3)  aluminum hydroxide/magnesium hydroxide/simethicone Suspension 30 milliLiter(s) Oral every 4 hours PRN Dyspepsia  melatonin 3 milliGRAM(s) Oral at bedtime PRN Insomnia  ondansetron Injectable 4 milliGRAM(s) IV Push every 8 hours PRN Nausea and/or Vomiting      CAPILLARY BLOOD GLUCOSE        I&O's Summary    18 Feb 2025 07:01  -  19 Feb 2025 07:00  --------------------------------------------------------  IN: 840 mL / OUT: 0 mL / NET: 840 mL    19 Feb 2025 07:01  -  19 Feb 2025 20:09  --------------------------------------------------------  IN: 600 mL / OUT: 0 mL / NET: 600 mL        PHYSICAL EXAM:  Vital Signs Last 24 Hrs  T(C): 36.6 (19 Feb 2025 19:41), Max: 36.6 (19 Feb 2025 19:41)  T(F): 97.9 (19 Feb 2025 19:41), Max: 97.9 (19 Feb 2025 19:41)  HR: 82 (19 Feb 2025 19:41) (64 - 100)  BP: 105/62 (19 Feb 2025 19:41) (100/63 - 105/62)  BP(mean): --  RR: 18 (19 Feb 2025 19:41) (15 - 18)  SpO2: 97% (19 Feb 2025 19:41) (97% - 99%)    Parameters below as of 19 Feb 2025 19:41  Patient On (Oxygen Delivery Method): room air    CONSTITUTIONAL: thin, emaciated male in NAD  EYES: PERRLA; conjunctiva and sclera clear  ENMT: Moist oral mucosa, no pharyngeal injection or exudates; normal dentition  NECK: Supple, no palpable masses; no thyromegaly  RESPIRATORY: Normal respiratory effort; lungs are clear to auscultation bilaterally  CARDIOVASCULAR: Regular rate and rhythm, normal S1 and S2, no murmur/rub/gallop; No lower extremity edema  ABDOMEN: Soft, Nondistended, nontender to palpation on exam, normoactive bowel sounds  MUSCULOSKELETAL: No clubbing or cyanosis of digits; no joint swelling or tenderness to palpation  PSYCH: A+O to person, place, and time; affect appropriate, anxious appearing  NEUROLOGY: CN 2-12 are intact and symmetric; no gross sensory deficits   SKIN: No rashes; no palpable lesions    LABS:      RADIOLOGY & ADDITIONAL TESTS:  Results Reviewed:   Imaging Personally Reviewed:  Electrocardiogram Personally Reviewed:    COORDINATION OF CARE:  Care Discussed with Consultants/Other Providers [Y]: medicine NP Sylvia  Prior or Outpatient Records Reviewed [Y/N]:

## 2025-02-19 NOTE — PROGRESS NOTE ADULT - NSPROGADDITIONALINFOA_GEN_ALL_CORE
.  Arelis Perez MD  Division of Hospital Medicine  [facility]   Available on Microsoft Teams - messages preferred prior to calls.    Will need all imaging made into CDs for him to take with him which he will need to mail to Dr. Noriega in order to have initial consult.    Plan discussed with patient, wife Marguerite on speakerphone bedside on 2/18, and medicine NP Sylvia.

## 2025-02-20 ENCOUNTER — TRANSCRIPTION ENCOUNTER (OUTPATIENT)
Age: 45
End: 2025-02-20

## 2025-02-20 PROCEDURE — 74019 RADEX ABDOMEN 2 VIEWS: CPT | Mod: 26

## 2025-02-20 PROCEDURE — 74018 RADEX ABDOMEN 1 VIEW: CPT | Mod: 26

## 2025-02-20 PROCEDURE — 99239 HOSP IP/OBS DSCHRG MGMT >30: CPT

## 2025-02-20 RX ORDER — OMEPRAZOLE 20 MG/1
1 CAPSULE, DELAYED RELEASE ORAL
Qty: 0 | Refills: 0 | DISCHARGE

## 2025-02-20 RX ORDER — TAMSULOSIN HYDROCHLORIDE 0.4 MG/1
1 CAPSULE ORAL
Qty: 0 | Refills: 0 | DISCHARGE
Start: 2025-02-20

## 2025-02-20 RX ORDER — TAMSULOSIN HYDROCHLORIDE 0.4 MG/1
1 CAPSULE ORAL
Qty: 30 | Refills: 0
Start: 2025-02-20 | End: 2025-03-21

## 2025-02-20 RX ORDER — GABAPENTIN 400 MG/1
1 CAPSULE ORAL
Refills: 0 | DISCHARGE

## 2025-02-20 RX ORDER — SERTRALINE 100 MG/1
1 TABLET, FILM COATED ORAL
Refills: 0 | DISCHARGE

## 2025-02-20 RX ORDER — MIRTAZAPINE 30 MG/1
2 TABLET, FILM COATED ORAL
Refills: 0 | DISCHARGE

## 2025-02-20 RX ORDER — GABAPENTIN 400 MG/1
1 CAPSULE ORAL
Qty: 90 | Refills: 0
Start: 2025-02-20 | End: 2025-03-21

## 2025-02-20 RX ORDER — B1/B2/B3/B5/B6/B12/VIT C/FOLIC 500-0.5 MG
1 TABLET ORAL
Qty: 0 | Refills: 0 | DISCHARGE
Start: 2025-02-20

## 2025-02-20 RX ORDER — SERTRALINE 100 MG/1
1 TABLET, FILM COATED ORAL
Qty: 0 | Refills: 0 | DISCHARGE
Start: 2025-02-20

## 2025-02-20 RX ORDER — MIRTAZAPINE 30 MG/1
2 TABLET, FILM COATED ORAL
Qty: 60 | Refills: 0
Start: 2025-02-20 | End: 2025-03-21

## 2025-02-20 RX ORDER — GABAPENTIN 400 MG/1
1 CAPSULE ORAL
Qty: 0 | Refills: 0 | DISCHARGE
Start: 2025-02-20

## 2025-02-20 RX ORDER — ACETAMINOPHEN 500 MG/5ML
2 LIQUID (ML) ORAL
Qty: 0 | Refills: 0 | DISCHARGE
Start: 2025-02-20

## 2025-02-20 RX ADMIN — Medication 10 MILLIGRAM(S): at 08:08

## 2025-02-20 RX ADMIN — GABAPENTIN 300 MILLIGRAM(S): 400 CAPSULE ORAL at 21:18

## 2025-02-20 RX ADMIN — Medication 40 MILLIGRAM(S): at 11:17

## 2025-02-20 RX ADMIN — GABAPENTIN 300 MILLIGRAM(S): 400 CAPSULE ORAL at 13:51

## 2025-02-20 RX ADMIN — MIRTAZAPINE 7.5 MILLIGRAM(S): 30 TABLET, FILM COATED ORAL at 22:58

## 2025-02-20 RX ADMIN — Medication 1 MILLIGRAM(S): at 08:08

## 2025-02-20 RX ADMIN — Medication 2 MILLIGRAM(S): at 21:19

## 2025-02-20 RX ADMIN — Medication 1 TABLET(S): at 11:17

## 2025-02-20 RX ADMIN — Medication 1 MILLIGRAM(S): at 13:51

## 2025-02-20 RX ADMIN — Medication 10 MILLIGRAM(S): at 15:40

## 2025-02-20 RX ADMIN — SERTRALINE 50 MILLIGRAM(S): 100 TABLET, FILM COATED ORAL at 11:17

## 2025-02-20 RX ADMIN — Medication 10 MILLIGRAM(S): at 11:18

## 2025-02-20 RX ADMIN — TAMSULOSIN HYDROCHLORIDE 0.4 MILLIGRAM(S): 0.4 CAPSULE ORAL at 21:19

## 2025-02-20 NOTE — DISCHARGE NOTE PROVIDER - DETAILS OF MALNUTRITION DIAGNOSIS/DIAGNOSES
This patient has been assessed with a concern for Malnutrition and was treated during this hospitalization for the following Nutrition diagnosis/diagnoses:     -  02/14/2025: Severe protein-calorie malnutrition   -  02/14/2025: Underweight (BMI < 19)

## 2025-02-20 NOTE — DISCHARGE NOTE PROVIDER - NSDCMRMEDTOKEN_GEN_ALL_CORE_FT
acetaminophen 325 mg oral tablet: 2 tab(s) orally every 6 hours As needed Temp greater or equal to 38C (100.4F), Mild Pain (1 - 3)  ALPRAZolam 1 mg oral tablet: 1 tab(s) orally 2 times a day  gabapentin 100 mg oral capsule: 1 cap(s) orally 3 times a day  gabapentin 300 mg oral capsule: 1 cap(s) orally 3 times a day  mirtazapine 7.5 mg oral tablet: 2 tab(s) orally once a day (at bedtime)  omeprazole 40 mg oral delayed release capsule: 1 cap(s) orally once a day (at bedtime)  Pepcid 40 mg oral tablet: 1 tab(s) orally once a day  sertraline 50 mg oral tablet: 1 tab(s) orally once a day  sertraline 50 mg oral tablet: 1 tab(s) orally once a day  tamsulosin 0.4 mg oral capsule: 1 cap(s) orally once a day (at bedtime)  Xyzal 5 mg oral tablet: 1 tab(s) orally once a day   acetaminophen 325 mg oral tablet: 2 tab(s) orally every 6 hours as needed for  Mild Pain (1 - 3)  ALPRAZolam 1 mg oral tablet: 1 tab(s) orally 2 times a day  dicyclomine 10 mg oral capsule: 1 cap(s) orally 3 times a day (before meals) as needed for Intestinal spasms  gabapentin 300 mg oral capsule: 1 cap(s) orally 3 times a day  mirtazapine 7.5 mg oral tablet: 2 tab(s) orally once a day (at bedtime)  Multiple Vitamins oral tablet: 1 tab(s) orally once a day  omeprazole 40 mg oral delayed release capsule: 1 cap(s) orally 2 times a day  Pepcid 40 mg oral tablet: 1 tab(s) orally once a day  sertraline 50 mg oral tablet: 1 tab(s) orally once a day  tamsulosin 0.4 mg oral capsule: 1 cap(s) orally once a day (at bedtime)  Xyzal 5 mg oral tablet: 1 tab(s) orally once a day   acetaminophen 325 mg oral tablet: 2 tab(s) orally every 6 hours as needed for  Mild Pain (1 - 3)  ALPRAZolam 1 mg oral tablet: 1 tab(s) orally 2 times a day  dicyclomine 10 mg oral capsule: 1 cap(s) orally 3 times a day (before meals) as needed for Intestinal spasms  gabapentin 300 mg oral capsule: 1 cap(s) orally 3 times a day  mirtazapine 7.5 mg oral tablet: 2 tab(s) orally once a day (at bedtime)  Multiple Vitamins oral tablet: 1 tab(s) orally once a day  omeprazole 40 mg oral delayed release capsule: 1 cap(s) orally 2 times a day MDD: 80  omeprazole 40 mg oral delayed release capsule: 1 cap(s) orally 2 times a day  Pepcid 40 mg oral tablet: 1 tab(s) orally once a day  sertraline 50 mg oral tablet: 1 tab(s) orally once a day  tamsulosin 0.4 mg oral capsule: 1 cap(s) orally once a day (at bedtime)  Xyzal 5 mg oral tablet: 1 tab(s) orally once a day

## 2025-02-20 NOTE — DISCHARGE NOTE PROVIDER - PROVIDER TOKENS
PROVIDER:[TOKEN:[[id number]],FOLLOWUP:[1 week]],FREE:[LAST:[Dr. NORIEGA],PHONE:[(   )    -],FAX:[(   )    -],ADDRESS:[Dr. Noriega at [place] for an outpatient consult],FOLLOWUP:[1 week]],PROVIDER:[TOKEN:[[id number]],ESTABLISHEDPATIENT:[T]] PROVIDER:[TOKEN:[[id number]],FOLLOWUP:[1 week]],PROVIDER:[TOKEN:[[id number]],ESTABLISHEDPATIENT:[T]],FREE:[LAST:[Dr. NORIEGA],PHONE:[(   )    -],FAX:[(   )    -],ADDRESS:[Dr. Noriega at [place] for an outpatient consult],FOLLOWUP:[1 week]],PROVIDER:[TOKEN:[[id number]],FOLLOWUP:[1 week]]

## 2025-02-20 NOTE — DISCHARGE NOTE PROVIDER - CARE PROVIDERS DIRECT ADDRESSES
,DirectAddress_Unknown,DirectAddress_Unknown,DirectAddress_Unknown ,DirectAddress_Unknown,DirectAddress_Unknown,DirectAddress_Unknown,[email or web address]

## 2025-02-20 NOTE — DISCHARGE NOTE PROVIDER - NSDCCPCAREPLAN_GEN_ALL_CORE_FT
PRINCIPAL DISCHARGE DIAGNOSIS  Diagnosis: Chronic abdominal pain  Assessment and Plan of Treatment: An  esophagogastroduodenoscopy (EGD) performed on 11/24 revealed gastritis. You were started on pantoprazole 40mg BID and famotidine (Pepcid) daily. A CT scan of the abdomen and pelvis showed no acute pathology. A mesenteric Doppler ultrasound raised concern for superior mesenteric artery (SMA) syndrome or median arcuate ligament syndrome (MALS). A barium esophagram was unremarkable.   Pain improved with bentyl 10mg TID with meals. Sertraline 50mg daily and gabapentin 300mg TID were also prescribed. GI and Surgery were consulted. Surgery recommended no inpatient intervention for SMA syndrome or MALS and noted there are no MALS specialists at [facility]. Please follow up with Dr. Noriega at [place] for an outpatient consult. A CT angiogram of the abdomen and pelvis was ordered however you were not able to clear the contrast. Please follow up with Dr. Chad Schuster to have the CTA done outpatient.      SECONDARY DISCHARGE DIAGNOSES  Diagnosis: Weight loss  Assessment and Plan of Treatment: Workup unrevealing.  Mirtazepine 7.5mg PO QHS was prescribed to stimulate appetite. The barium esophagram was within normal limits. Follow up with GI     PRINCIPAL DISCHARGE DIAGNOSIS  Diagnosis: Chronic abdominal pain  Assessment and Plan of Treatment: An  esophagogastroduodenoscopy (EGD) performed on 11/24 revealed gastritis. You were started on pantoprazole 40mg BID and famotidine (Pepcid) daily. A CT scan of the abdomen and pelvis showed no acute pathology. A mesenteric Doppler ultrasound raised concern for superior mesenteric artery (SMA) syndrome or median arcuate ligament syndrome (MALS). A barium esophagram was unremarkable.   Pain improved with bentyl 10mg TID with meals. Sertraline 50mg daily and gabapentin 300mg TID were also prescribed. GI and Surgery were consulted. Surgery recommended no inpatient intervention for SMA syndrome or MALS and noted there are no MALS specialists at [facility]. Please follow up with Dr. Noriega at [place] for an outpatient consult. A CT angiogram of the abdomen and pelvis was ordered however you were not able to clear the contrast. Please follow up with Dr. Chad Schuster to have the CTA done outpatient.      SECONDARY DISCHARGE DIAGNOSES  Diagnosis: Weight loss  Assessment and Plan of Treatment: Workup unrevealing.  Mirtazepine 7.5mg PO QHS was prescribed to stimulate appetite. The barium esophagram was within normal limits. Follow up with GI    Diagnosis: SMAS (superior mesenteric artery syndrome)  Assessment and Plan of Treatment: patient was given Dr. Liu's outpatient office phone number and address. At this time the patient plans to f/u with Dr. Noriega ([place]) for MALS release

## 2025-02-20 NOTE — DISCHARGE NOTE PROVIDER - HOSPITAL COURSE
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  He says that his symptoms began after a round of antibiotics for mastoiditis.  He then developed C diff x 5 over 1 year and received Vowst.  After that he was able to regain the weight he lost over the year with C. Diff back up to 145lbs (baseilne was 170lbs). He then began developing post prandial pain with eating.  He says he gets excruciating pain within 20 minutes after eating anything. The pain is sharp/stabbing and "tightness".   He estimates his daily intake to be approx 500calories per day. He presents today after diarrhea yesterday and ongoing weightloss.   He has had EGD 11/24 showing gastritis only.  (13 Feb 2025 18:57)    Hospital Course: Patient presented with abdominal pain. An esophagogastroduodenoscopy (EGD) performed on 11/24 revealed gastritis. The patient was started on pantoprazole 40mg BID and famotidine (Pepcid) daily. A CT scan of the abdomen and pelvis showed no acute pathology. A mesenteric Doppler ultrasound raised concern for superior mesenteric artery (SMA) syndrome or median arcuate ligament syndrome (MALS). A barium esophagram was unremarkable. The patient's pain improved with bentyl 10mg TID with meals. Sertraline 50mg daily and gabapentin 300mg TID were also prescribed. GI and Surgery were consulted. Surgery recommended no inpatient intervention for SMA syndrome or MALS and noted there are no MALS specialists at [facility]. The patient will follow up with Dr. Noriega at [place] for an outpatient consult. A CT angiogram of the abdomen and pelvis is pending clearance of contrast from the barium esophagram and will be needed for the consult. Patient unable to pass contrast and declined enema while inpatient. Patient will follow up with Dr. Chad Schuster for CTA outpatient.     Weight Loss: The patient presented with weight loss. A nutrition consult was obtained. Mirtazepine 7.5mg PO QHS was prescribed to stimulate appetite. The barium esophagram was within normal limits.    Anxiety: The patient reports anxiety. They have a prescription for alprazolam 2mg PO BID from [facility], with the last 30-day supply picked up on 2/08. The patient reports taking 1mg BID and 2mg QHS for sleep.    Need for Prophylactic Measure: The patient was on a regular diet. No specific prophylactic measures were indicated other than holding antibiotics given the history of C. difficile infection.    Incidental Findings: A CT of the abdomen and pelvis showed opacities in the lungs. The patient was afebrile, without leukocytosis, cough, or dyspnea, and antibiotics were held given the history of C. difficile. This will require outpatient follow up.    Patient is medically clear for discharge by Dr. Perez to home. Outpatient follow up with PCP, Dr. NORIEGA, Dr. Chad Schuster  Med recc and clearance discussed with attending    Important Medication Changes and Reason:  see med rec     Active or Pending Issues Requiring Follow-up:  Dr. Chad Schuster for CTA    Dr. Noriega at [place] for an outpatient consult.  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  He says that his symptoms began after a round of antibiotics for mastoiditis.  He then developed C diff x 5 over 1 year and received Vowst.  After that he was able to regain the weight he lost over the year with C. Diff back up to 145lbs (baseilne was 170lbs). He then began developing post prandial pain with eating.  He says he gets excruciating pain within 20 minutes after eating anything. The pain is sharp/stabbing and "tightness".   He estimates his da  t will follow up with Dr. Noriega at [place] for an outpatient consult. A CT angiogram of the abdomen and pelvis is pending clearance of contrast from the barium esophagram and will be needed for the consult. Patient unable to pass contrast and declined enema while inpatient. Patient will follow up with Dr. Chad Schuster for CTA outpatient.     Weight Loss: The patient presented with weight loss. A nutrition consult was obtained. Mirtazepine 7.5mg PO QHS was prescribed to stimulate appetite. The barium esophagram was within normal limits.    Anxiety: The patient reports anxiety. They have a prescription for alprazolam 2mg PO BID from [facility], with the last 30-day supply picked up on 2/08. The patient reports taking 1mg BID and 2mg QHS for sleep.    Need for Prophylactic Measure: The patient was on a regular diet. No specific prophylactic measures were indicated other than holding antibiotics given the history of C. difficile infection.    Incidental Findings: A CT of the abdomen and pelvis showed opacities in the lungs. The patient was afebrile, without leukocytosis, cough, or dyspnea, and antibiotics were held given the history of C. difficile. This will require outpatient follow up.    Patient is medically clear for discharge by Dr. Perez to home. Outpatient follow up with PCP, Dr. NORIEGA, Dr. Chad Schuster  Med recc and clearance discussed with attending    Important Medication Changes and Reason:  see med rec     Active or Pending Issues Requiring Follow-up:  Dr. Chad Schuster for CTA    Dr. Noriega at [place] for an outpatient consult.  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  He says that his symptoms began after a round of antibiotics for mastoiditis.  He then developed C diff x 5 over 1 year and received Vowst.  After that he was able to regain the weight he lost over the year with C. Diff back up to 145lbs (baseilne was 170lbs). He then began developing post prandial pain with eating.  He says he gets excruciating pain within 20 minutes after eating anything. The pain is sharp/stabbing and "tightness".   He estimates his da  t will follow up with Dr. Noriega at [place] for an outpatient consult. A CT angiogram of the abdomen and pelvis is pending clearance of contrast from the barium esophagram and will be needed for the consult. Patient unable to pass contrast and declined enema while inpatient. Patient will follow up with Dr. Chad Schuster for CTA outpatient.     Weight Loss: The patient presented with weight loss. A nutrition consult was obtained. Mirtazepine 7.5mg PO QHS was prescribed to stimulate appetite. The barium esophagram was within normal limits.    Anxiety: The patient reports anxiety. They have a prescription for alprazolam 2mg PO BID from [facility], with the last 30-day supply picked up on 2/08. The patient reports taking 1mg BID and 2mg QHS for sleep.    Need for Prophylactic Measure: The patient was on a regular diet. No specific prophylactic measures were indicated other than holding antibiotics given the history of C. difficile infection.    Incidental Findings: A CT of the abdomen and pelvis showed opacities in the lungs. The patient was afebrile, without leukocytosis, cough, or dyspnea, and antibiotics were held given the history of C. difficile. This will require outpatient follow up.    Patient is medically clear for discharge by Dr. Perez to home. Outpatient follow up with PCP, Dr. NORIEGA, Dr. Chad Schuster  Med recc and clearance discussed with attending    Important Medication Changes and Reason:  see med rec     Active or Pending Issues Requiring Follow-up:  Dr. Chad Schuster for CTA   Dr. Noriega at [place] for an outpatient consult.  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  He says that his symptoms began after a round of antibiotics for mastoiditis.  He then developed C diff x 5 over 1 year and received Vowst.  After that he was able to regain the weight he lost over the year with C. Diff back up to 145lbs (baseilne was 170lbs). He then began developing post prandial pain with eating.  He says he gets excruciating pain within 20 minutes after eating anything. The pain is sharp/stabbing and "tightness".   He estimates his da  t will follow up with Dr. Noriega at [place] for an outpatient consult. A CT angiogram of the abdomen and pelvis is pending clearance of contrast from the barium esophagram and will be needed for the consult. Patient unable to pass contrast and declined enema while inpatient. Patient will follow up with Dr. Chad Schuster for CTA outpatient.     Weight Loss: The patient presented with weight loss. A nutrition consult was obtained. Mirtazepine 7.5mg PO QHS was prescribed to stimulate appetite. The barium esophagram was within normal limits.    Anxiety: The patient reports anxiety. They have a prescription for alprazolam 2mg PO BID from [facility], with the last 30-day supply picked up on 2/08. The patient reports taking 1mg BID and 2mg QHS for sleep.    Need for Prophylactic Measure: The patient was on a regular diet. No specific prophylactic measures were indicated other than holding antibiotics given the history of C. difficile infection.    Incidental Findings: A CT of the abdomen and pelvis showed opacities in the lungs. The patient was afebrile, without leukocytosis, cough, or dyspnea, and antibiotics were held given the history of C. difficile. This will require outpatient follow up.    Patient is medically clear for discharge by Dr. Perez to home. Outpatient follow up with PCP, Dr. NORIEGA, Dr. Chad Schuster  Med recc and clearance discussed with attending    Important Medication Changes and Reason:  see med rec     Active or Pending Issues Requiring Follow-up:  Dr. Chad Schuster for CTA   Dr. Noriega at [place] for an outpatient consult.  Follow up with PCP     Advanced Directives:   [x] Full code  [ ] DNR  [ ] Hospice    Discharge Diagnoses:  MALS  SMA syndrome  weight loss  chronic abdominal pain

## 2025-02-20 NOTE — DISCHARGE NOTE PROVIDER - NSDCFUSCHEDAPPT_GEN_ALL_CORE_FT
Carlos Henriquez  [facility]  VASCULAR [address]  Scheduled Appointment: 02/27/2025    Rafa Liu  [facility]  GENSURG [address] [facility] [address]  Scheduled Appointment: 03/03/2025    Jerry Weldon  [facility]  GENSURG [address] [facility]  Scheduled Appointment: 03/03/2025

## 2025-02-20 NOTE — DISCHARGE NOTE PROVIDER - NSDCFUADDAPPT_GEN_ALL_CORE_FT
APPTS ARE READY TO BE MADE: [x ] YES    Best Family or Patient Contact (if needed):    Additional Information about above appointments (if needed):    1:   2:   3:     Other comments or requests:    APPTS ARE READY TO BE MADE: [x] YES    Best Family or Patient Contact (if needed):    Additional Information about above appointments (if needed):    1:   2:   3:     Other comments or requests:

## 2025-02-20 NOTE — CHART NOTE - NSCHARTNOTEFT_GEN_A_CORE
The Drug Utilization Report below displays all of the controlled substance prescriptions, if any, that your patient has filled in the last twelve months. The information displayed on this report is compiled from pharmacy submissions to the Department, and accurately reflects the information as submitted by the pharmacies.    Practitioner Count: 1  Pharmacy Count: 1  Current Opioid Prescriptions: 0  Current Benzodiazepine Prescriptions: 1  Current Stimulant Prescriptions: 1      Patient Demographic Information (PDI)       PDI	First Name	Last Name	Birth Date	Gender	Street Address	[facility] Code  CHINO Rapp	1980	Male	[address] [facility]	[id number]    Prescription Information      PDI Filter:    PDI	Current Rx	Drug Type	Rx Written	Rx Dispensed	Drug	Quantity	Days Supply	Prescriber Name	Prescriber GEO #	Payment Method	Dispenser  A	Y	S	01/31/2025	02/10/2025	dextroamp-amphet er 10 mg cap	30	30	Bessy Pryor MD	[id number]	[facility] #[id number]  A	Y	B	01/31/2025	02/10/2025	alprazolam 2 mg tablet	60	30	Bessy Pryor MD	[id number]	[facility] #[id number]  A	N	B	12/20/2024	01/09/2025	alprazolam 2 mg tablet	60	30	Bessy Pryor MD	[id number]	[facility] #[id number]  A	N	S	12/20/2024	01/09/2025	dextroamp-amphet er 10 mg cap	30	30	Bessy Pryor MD	[id number]	[facility] #[id number]  A	N	B	11/26/2024	12/09/2024	alprazolam 2 mg tablet	60	30	Bessy Pryor MD	[id number]	[facility] #[id number]  A	N	S	11/26/2024	12/09/2024	dextroamp-amphet er 10 mg cap	30	30	Bessy Pryor MD	[id number]	[facility] #[id number]  A	N	B	10/29/2024	11/09/2024	alprazolam 2 mg tablet	60	30	Bessy Pryor MD	[id number]	[facility] #[id number]  A	N	B	09/27/2024	10/09/2024	alprazolam 2 mg tablet	60	30	Bessy Pryor MD	[id number]	[facility] #[id number]  A	N	B	09/03/2024	09/11/2024	alprazolam 2 mg tablet	60	30	Bessy Pryor MD	[id number]	Insurance	[facility] #[id number]  A	N	B	07/26/2024	08/11/2024	alprazolam 2 mg tablet	60	30	Bessy Pryor MD	[id number]	Insurance	[facility] #[id number]  A	N	S	07/26/2024	08/11/2024	dextroamp-amphet er 10 mg cap	30	30	Bessy Pryor MD	[id number]	Insurance	[facility] #[id number]  A	N	B	06/20/2024	07/03/2024	alprazolam 2 mg tablet	60	30	Bessy Pryor MD	[id number]	Insurance	[facility] #[id number]  A	N	S	06/20/2024	07/03/2024	dextroamp-amphet er 10 mg cap	30	30	Bessy Pryor MD	[id number]	Insurance	[facility] #[id number]  A	N	B	05/24/2024	05/30/2024	alprazolam 2 mg tablet	60	30	Bessy Pryor MD	[id number]	Insurance	[facility] #[id number]  A	N	S	05/24/2024	05/30/2024	dextroamp-amphet er 10 mg cap	30	30	Bessy Pryor MD	[id number]	Insurance	[facility] #[id number]  A	N	B	04/25/2024	04/27/2024	alprazolam 2 mg tablet	60	30	Bessy Pryor MD	[id number]	Insurance	[facility] #[id number]  A	N	B	03/26/2024	03/27/2024	alprazolam 2 mg tablet	60	30	Bessy Pryor MD	[id number]	Insurance	[facility] #[id number]  A	N	B	02/27/2024	02/28/2024	alprazolam 2 mg tablet	60	30	Bessy Pryor MD	[id number]	Insurance	[facility] #[id number]
The patient was informed that Dr. Liu and Dr. Henriquez would able to provide management for MALS. The patient was given Dr. Liu's outpatient office phone number and address. At this time the patient plans to f/u with Dr. Noriega ([place]) for MALS release.     - Trauma/ ACS  [id number]
Brief GI Note:    Patient's esophogram reviewed, no abnormalities.     Remainder of care per primary team.     GI team will sign off at this time. Please feel free to reconsult as needed.       Cassidy Rolle  GI/Hepatology Fellow    MONDAY-FRIDAY 8AM-5PM:  Pager# [phone] ([facility]) or [phone] ([facility])    NON-URGENT CONSULTS:  Please email [email or web address]  AT NIGHT AND ON WEEKENDS:  Contact on-call GI fellow from 5pm-8am and on weekends/holidays

## 2025-02-20 NOTE — DISCHARGE NOTE PROVIDER - CARE PROVIDER_API CALL
Chad Mondragon  Gastroenterology  [address] [place] [address]  Phone: [phone]  Fax: [phone]  Follow Up Time: 1 week    Dr. NORIEGA,   Dr. Noriega at [place] for an outpatient consult  Phone: (   )    -  Fax: (   )    -  Follow Up Time: 1 week    Bessy Pryor  Pediatrics  [address] [place] [address]  Phone: [phone]  Fax: [phone]  Established Patient  Follow Up Time:    Chad Mondragon  Gastroenterology  [address] [place] [address]  Phone: [phone]  Fax: [phone]  Follow Up Time: 1 week    Bessy Pryor  Pediatrics  [address] [place] [address]  Phone: [phone]  Fax: [phone]  Established Patient  Follow Up Time:     Dr. NUÑEZ,   Dr. Nuñez at [place] for an outpatient consult  Phone: (   )    -  Fax: (   )    -  Follow Up Time: 1 week    Rafa Liu  Surgery  [address] [place] [address], [address]  [place] [address]  Phone: [phone]  Fax: [phone]  Follow Up Time: 1 week

## 2025-02-20 NOTE — PROGRESS NOTE ADULT - SUBJECTIVE AND OBJECTIVE BOX
Arelis Perez MD  Division of Hospital Medicine  [facility]   Available on [employer] Teams (Mon-Fri 8am-5pm)    * messages preferred prior to calls  Other Times:  [phone]      Patient is a 44y old  Male who presents with a chief complaint of Abdominal Pain (20 Feb 2025 14:35)      SUBJECTIVE / OVERNIGHT EVENTS: no acute events overnight. abd pain significantly improved. still with barium on repeat XR so CTA to be deferred outpatient.  ADDITIONAL REVIEW OF SYSTEMS:    MEDICATIONS  (STANDING):  ALPRAZolam 1 milliGRAM(s) Oral <User Schedule>  ALPRAZolam 2 milliGRAM(s) Oral at bedtime  Beano Supplement Capsule 1 Capsule(s) 1 Capsule(s) Oral three times a day before meals  dicyclomine 10 milliGRAM(s) Oral three times a day before meals  famotidine    Tablet 40 milliGRAM(s) Oral daily  gabapentin 300 milliGRAM(s) Oral three times a day  influenza   Vaccine 0.5 milliLiter(s) IntraMuscular once  mirtazapine 7.5 milliGRAM(s) Oral at bedtime  multivitamin 1 Tablet(s) Oral daily  pantoprazole    Tablet 40 milliGRAM(s) Oral two times a day  sertraline 50 milliGRAM(s) Oral daily  tamsulosin 0.4 milliGRAM(s) Oral at bedtime    MEDICATIONS  (PRN):  acetaminophen     Tablet .. 650 milliGRAM(s) Oral every 6 hours PRN Temp greater or equal to 38C (100.4F), Mild Pain (1 - 3)  aluminum hydroxide/magnesium hydroxide/simethicone Suspension 30 milliLiter(s) Oral every 4 hours PRN Dyspepsia  melatonin 3 milliGRAM(s) Oral at bedtime PRN Insomnia  ondansetron Injectable 4 milliGRAM(s) IV Push every 8 hours PRN Nausea and/or Vomiting      CAPILLARY BLOOD GLUCOSE        I&O's Summary    19 Feb 2025 07:01  -  20 Feb 2025 07:00  --------------------------------------------------------  IN: 1200 mL / OUT: 0 mL / NET: 1200 mL    20 Feb 2025 07:01  -  20 Feb 2025 16:18  --------------------------------------------------------  IN: 960 mL / OUT: 0 mL / NET: 960 mL        PHYSICAL EXAM:  Vital Signs Last 24 Hrs  T(C): 36.1 (20 Feb 2025 09:10), Max: 36.6 (19 Feb 2025 19:41)  T(F): 97 (20 Feb 2025 09:10), Max: 97.9 (19 Feb 2025 19:41)  HR: 98 (20 Feb 2025 09:10) (82 - 103)  BP: 111/72 (20 Feb 2025 09:10) (105/62 - 117/76)  BP(mean): --  RR: 18 (20 Feb 2025 09:10) (18 - 18)  SpO2: 100% (20 Feb 2025 09:10) (97% - 100%)    Parameters below as of 20 Feb 2025 09:10  Patient On (Oxygen Delivery Method): room air    CONSTITUTIONAL: thin, emaciated male in NAD  EYES: PERRLA; conjunctiva and sclera clear  ENMT: Moist oral mucosa, no pharyngeal injection or exudates; normal dentition  NECK: Supple, no palpable masses; no thyromegaly  RESPIRATORY: Normal respiratory effort; lungs are clear to auscultation bilaterally  CARDIOVASCULAR: Regular rate and rhythm, normal S1 and S2, no murmur/rub/gallop; No lower extremity edema  ABDOMEN: Soft, Nondistended, nontender to palpation on exam, normoactive bowel sounds  MUSCULOSKELETAL: No clubbing or cyanosis of digits; no joint swelling or tenderness to palpation  PSYCH: A+O to person, place, and time; affect appropriate, anxious  NEUROLOGY: CN 2-12 are intact and symmetric; no gross sensory deficits   SKIN: No rashes; no palpable lesions    LABS:      RADIOLOGY & ADDITIONAL TESTS:  Results Reviewed:   Imaging Personally Reviewed:  Abd XR reviewed with barium contrast still present  Electrocardiogram Personally Reviewed:    COORDINATION OF CARE:  Care Discussed with Consultants/Other Providers [Y]: medicine NP Zeta  Prior or Outpatient Records Reviewed [Y/N]:

## 2025-02-20 NOTE — PROGRESS NOTE ADULT - PROBLEM SELECTOR PLAN 1
Patient up and ambulatory to restroom independently and is noted to have a steady gait.  Will continue to assess.     had EGD 11/24 showing gastritis - c/w pantoprazole 40mg BID and pepcid daily  - CT abd/pelv with no acute pathology  - US doppler mesenteric concerning for SMA syndrome or MALS  - barium esophagram without acute pathology  - c/w sertraline 50mg daily and gabapentin 300mg TID  - c/w bentyl 10mg TID w/ meals - pain improved  - c/w PPI BID and famotidine daily   - GI and Surgery consult recs appreciated  - Per Surgery, SMA syndrome or MALS no intervention inpatient  - Surgery f/u today appreciated: patient was informed that Dr. Liu and Dr. Henriquez would able to provide management for MALS. The patient was given Dr. Liu's outpatient office phone number and address. At this time the patient plans to f/u with Dr. Noriega ([place]) for MALS release.   - pt to f/u Dr. Noriega in [place] outpatient - for initial consult with Dr. Noriega, will need CT Angio abd/pelv performed and all imaging on CDs prior to d/c. but will need to have CTA outpatient as still as not cleared contrast from barium esophagram  - spoke with his GI office to ensure repeat CTA abd/pelv provided outpatient so he can have performed outpatient had EGD 11/24 showing gastritis - c/w pantoprazole 40mg BID and pepcid daily  - CT abd/pelv with no acute pathology  - US doppler mesenteric concerning for SMA syndrome or MALS  - barium esophagram without acute pathology  - c/w sertraline 50mg daily and gabapentin 300mg TID  - c/w bentyl 10mg TID w/ meals - pain improved  - c/w PPI BID and famotidine daily   - GI and Surgery consult recs appreciated  - Per Surgery, SMA syndrome or MALS -- no intervention inpatient  - Surgery f/u today appreciated: patient was informed that Dr. Liu and Dr. Henriquez would able to provide management for MALS. The patient was given Dr. iLu's outpatient office phone number and address. At this time the patient plans to f/u with Dr. Noriega ([place]) for MALS release.   - pt to f/u Dr. Noriega in [place] outpatient - for initial consult with Dr. Noriega, will need CT Angio abd/pelv performed and all imaging on CDs prior to d/c. but will need to have CTA outpatient as still as not cleared contrast from barium esophagram  - spoke with his outpatient GI office to ensure repeat CTA abd/pelv provided outpatient so he can have performed outpatient

## 2025-02-20 NOTE — PROGRESS NOTE ADULT - NSPROGADDITIONALINFOA_GEN_ALL_CORE
.  Arelis Perez MD  Division of Hospital Medicine  [facility]   Available on [employer] Teams - messages preferred prior to calls.    Medically clear for discharge home today with outpatient f/u with Dr. Noriega at [place] and Dr. Liu and Dr. Henriquez outpatient.  Patient to have CT Angio abd/pelv performed next week as outpatient. rx to be provided by his outpatient GI office.  Discharge planning time spent: 45 minutes.    Plan discussed with patient extensively, wife Marguerite via phone, and medicine NP Tyler. .  Arelis Perez MD  Division of Hospital Medicine  [facility]   Available on [employer] Teams - messages preferred prior to calls.    Medically clear for discharge home today with outpatient f/u with Dr. Noriega at [place] and Dr. Liu and Dr. Henriquez outpatient.  Patient to have CT Angio abd/pelv performed next week as outpatient. rx to be provided by his outpatient GI office.  CDs of inpatient imaging made and provided to patient provided to patient prior to d/c.  Discharge planning time spent: 45 minutes.    Plan discussed with patient extensively, wife Marguerite via phone, and medicine NP Tyler.

## 2025-02-21 ENCOUNTER — TRANSCRIPTION ENCOUNTER (OUTPATIENT)
Age: 45
End: 2025-02-21

## 2025-02-21 ENCOUNTER — NON-APPOINTMENT (OUTPATIENT)
Age: 45
End: 2025-02-21

## 2025-02-21 VITALS
TEMPERATURE: 98 F | DIASTOLIC BLOOD PRESSURE: 64 MMHG | OXYGEN SATURATION: 97 % | RESPIRATION RATE: 18 BRPM | HEART RATE: 92 BPM | SYSTOLIC BLOOD PRESSURE: 102 MMHG

## 2025-02-21 DIAGNOSIS — Z86.19 PERSONAL HISTORY OF OTHER INFECTIOUS AND PARASITIC DISEASES: ICD-10-CM

## 2025-02-21 DIAGNOSIS — Z87.19 PERSONAL HISTORY OF OTHER DISEASES OF THE DIGESTIVE SYSTEM: ICD-10-CM

## 2025-02-21 DIAGNOSIS — F41.9 ANXIETY DISORDER, UNSPECIFIED: ICD-10-CM

## 2025-02-21 DIAGNOSIS — F11.11 OPIOID ABUSE, IN REMISSION: ICD-10-CM

## 2025-02-21 DIAGNOSIS — F32.A ANXIETY DISORDER, UNSPECIFIED: ICD-10-CM

## 2025-02-21 PROCEDURE — 74018 RADEX ABDOMEN 1 VIEW: CPT

## 2025-02-21 PROCEDURE — 96374 THER/PROPH/DIAG INJ IV PUSH: CPT

## 2025-02-21 PROCEDURE — 83690 ASSAY OF LIPASE: CPT

## 2025-02-21 PROCEDURE — 80053 COMPREHEN METABOLIC PANEL: CPT

## 2025-02-21 PROCEDURE — 83735 ASSAY OF MAGNESIUM: CPT

## 2025-02-21 PROCEDURE — 85025 COMPLETE CBC W/AUTO DIFF WBC: CPT

## 2025-02-21 PROCEDURE — 93975 VASCULAR STUDY: CPT

## 2025-02-21 PROCEDURE — 99285 EMERGENCY DEPT VISIT HI MDM: CPT

## 2025-02-21 PROCEDURE — 82378 CARCINOEMBRYONIC ANTIGEN: CPT

## 2025-02-21 PROCEDURE — 87507 IADNA-DNA/RNA PROBE TQ 12-25: CPT

## 2025-02-21 PROCEDURE — 86301 IMMUNOASSAY TUMOR CA 19-9: CPT

## 2025-02-21 PROCEDURE — 74221 X-RAY XM ESOPHAGUS 2CNTRST: CPT

## 2025-02-21 PROCEDURE — 85027 COMPLETE CBC AUTOMATED: CPT

## 2025-02-21 PROCEDURE — 74019 RADEX ABDOMEN 2 VIEWS: CPT | Mod: MC

## 2025-02-21 PROCEDURE — 84100 ASSAY OF PHOSPHORUS: CPT

## 2025-02-21 PROCEDURE — 74177 CT ABD & PELVIS W/CONTRAST: CPT | Mod: MC

## 2025-02-21 PROCEDURE — 99231 SBSQ HOSP IP/OBS SF/LOW 25: CPT

## 2025-02-21 RX ORDER — OMEPRAZOLE 20 MG/1
1 CAPSULE, DELAYED RELEASE ORAL
Qty: 60 | Refills: 0
Start: 2025-02-21 | End: 2025-03-22

## 2025-02-21 RX ADMIN — GABAPENTIN 300 MILLIGRAM(S): 400 CAPSULE ORAL at 14:08

## 2025-02-21 RX ADMIN — Medication 10 MILLIGRAM(S): at 06:37

## 2025-02-21 RX ADMIN — Medication 10 MILLIGRAM(S): at 11:04

## 2025-02-21 RX ADMIN — Medication 1 MILLIGRAM(S): at 06:37

## 2025-02-21 RX ADMIN — GABAPENTIN 300 MILLIGRAM(S): 400 CAPSULE ORAL at 06:37

## 2025-02-21 RX ADMIN — Medication 1 MILLIGRAM(S): at 14:08

## 2025-02-21 RX ADMIN — SERTRALINE 50 MILLIGRAM(S): 100 TABLET, FILM COATED ORAL at 11:05

## 2025-02-21 RX ADMIN — Medication 40 MILLIGRAM(S): at 11:04

## 2025-02-21 RX ADMIN — Medication 1 TABLET(S): at 11:04

## 2025-02-21 NOTE — DISCHARGE NOTE NURSING/CASE MANAGEMENT/SOCIAL WORK - PATIENT PORTAL LINK FT
You can access the FollowMyHealth Patient Portal offered by [facility] by registering at the following website: [email or web address]. By joining [employer]’s FollowMyHealth portal, you will also be able to view your health information using other applications (apps) compatible with our system.

## 2025-02-21 NOTE — PROGRESS NOTE ADULT - REASON FOR ADMISSION
Abdominal Pain

## 2025-02-21 NOTE — PROGRESS NOTE ADULT - ASSESSMENT
44 male with PMH of anxiety, c. diff infection s/p fecal transplant who presents with subacute/chronic abdominal pain and weight loss with imaging concerning for MALS/SMA syndrome. 
44M pmhx of anxiety and C. Diff admitted with weight loss and abdominal pain. 
This is a 44 year old male with a pertinent history of chronic Clostridium difficile infection (dx in Jan. 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 GI consulted for these complaints.     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   Patient is presenting w/ constellation of sx of lower abdominal pain that improves w/ defecation, epigastric cramping, weight loss, dysphagia, and acid reflux. US mesenteric doppler significant for findings suggestive of MALS and SMA syndrome. Patient's epigastric cramping and weight loss can be explained by the US doppler findings. For acid reflux and dysphagia, ddx include GERD, esophageal dysmotility, and stricture. 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     Recommendation:  -surgery recs re: mgmt of MALS and SMA syndrome   - c/w pantoprazole 40mg BID and famotidine 40mg qhs  - can trial dicyclomine for abdominal cramping that can be 2/2 post-infectious IBS  - f/u barium esophagram read  - can consider Bravo pH study outpatient for further w/u of GERD iso relatively normal EGD in 11/2024  - can consider repeat outpatient colonoscopy if above mgmt does not improve lower abdominal pain  - rest of care primary team    Note incomplete until finalized by attending signature/attestation.    Unique Hawthorne  GI/Hepatology Fellow PGY5    NON-URGENT CONSULTS:  Please email [email or web address]  AT NIGHT AND ON WEEKENDS:  Available on [employer] Teams  [phone] ([place] Pager)    For urgent consults, please contact on call GI team. See Am[employer] schedule ([facility]) or [employer] system ([facility])
44 year old male with a pertinent history of chronic Clostridium difficile infection (dx in Jan. 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    Recommendations:  - no acute surgical intervention indicated  - appreciate GI recs  fu UGIS  - no MALS specialists available at this institution but patient following up with Dr. Noriega from [place] and potentially [facility] for outpatient MALS release.  - nutrition consult, patient happy with his weight gain    Acute Care and Trauma Surgery  [id number]
44 year old male with a pertinent history of chronic Clostridium difficile infection (dx in Jan. 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    Recommendations:  - no acute surgical intervention indicated  - UGI negative  - CT scan performed for Dr. Noriega  - will continue discussion of MALS with Dr. Liu  - patient following up with Dr. Noriega from [place] and potentially [facility] ER for outpatient MALS release.  - nutrition consult, patient happy with his weight gain    Acute Care and Trauma Surgery  [id number]
44M pmhx of anxiety and C. Diff admitted with weight loss and abdominal pain. 
44 male with PMH of anxiety, c. diff infection s/p fecal transplant who presents with subacute/chronic abdominal pain and weight loss with imaging concerning for MALS/SMA syndrome. 
44M pmhx of anxiety and C. Diff admitted with weight loss and abdominal pain. 
44 male with PMH of anxiety, c. diff infection s/p fecal transplant who presents with subacute/chronic abdominal pain and weight loss with imaging concerning for MALS/SMA syndrome. 
44 male with PMH of anxiety, c. diff infection s/p fecal transplant who presents with subacute/chronic abdominal pain and weight loss with imaging concerning for MALS/SMA syndrome. 
44M pmhx of anxiety and C. Diff admitted with weight loss and abdominal pain.

## 2025-02-21 NOTE — DISCHARGE NOTE NURSING/CASE MANAGEMENT/SOCIAL WORK - NSDPLANG ASIS_GEN_ALL_CORE
From: Rajani Santos  To: Deneen Doty MD  Sent: 6/29/2020 12:48 PM CDT  Subject: Non-Urgent Medical Question    Hello.     I have severe right flank pain. I believe I took my back out but now I’m thinking it may be a kidney stone. The pain is sharp and constant and I have a history of kidney stones and I believe when I broke my back they said I had some stones. I’m concerned they are moving. I have applied icy hot and am laying on an ice pack without relief while taking my medicine for my back       No

## 2025-02-21 NOTE — PROGRESS NOTE ADULT - PROBLEM SELECTOR PLAN 1
had EGD 11/24 showing gastritis - c/w pantoprazole 40mg BID and pepcid daily  - CT abd/pelv with no acute pathology  - US doppler mesenteric concerning for SMA syndrome or MALS  - barium esophagram without acute pathology  - c/w sertraline 50mg daily and gabapentin 300mg TID  - c/w bentyl 10mg TID w/ meals - pain improved  - c/w PPI BID and famotidine daily   - GI and Surgery consult recs appreciated  - Per Surgery, SMA syndrome or MALS -- no intervention inpatient  - Surgery f/u today appreciated: patient was informed that Dr. Liu and Dr. Henriquez would able to provide management for MALS. The patient was given Dr. Liu's outpatient office phone number and address. At this time the patient plans to f/u with Dr. Noriega ([place]) for MALS release.   - pt to f/u Dr. Noriega in [place] outpatient - for initial consult with Dr. Noriega, will need CT Angio abd/pelv performed and all imaging on CDs prior to d/c. but will need to have CTA outpatient as still as not cleared contrast from barium esophagram  - spoke with his outpatient GI office to ensure repeat CTA abd/pelv provided outpatient so he can have performed outpatient

## 2025-02-21 NOTE — PROGRESS NOTE ADULT - PROBLEM SELECTOR PLAN 3
Alprazolam 2mg PO BID prescribed at [facility] last picked up 30 day supply 2/08. He says that he takes 1mg BID and then 2mg QHS for sleep
Alprazolam 2mg PO BID prescribed at [facility] last picked up 30 day supply 2/08. He says that he takes 1mg BID and then 2mg QHS for sleep
Alprazolam 2mg PO BID prescribed at [facility] last picked up 30 day supply 2/08. He says that he takes 1mg BID and then 2mg QHS for sleep
Alprazolam 2mg PO BID prescribed at [facility] last picked up 30 day supply 2/08. He says that he takes 1mg BID and then 2mg QHS for sleep
Alprazolam 2mg PO BID prescribed at [facility] last picked up 30 day supply 2/08. He says that he takes 1mg BID and then 2mg QHS for sleep
Alprazolam 2mg PO BID prescribed at [facility] last picked up 30 day supply 2/08. He says that he takes 1mg BID and then 2mg QHS for sleep
Alprazolam 2mg PO BID prescribed at [facility] last picked up 30 day supply 2/08. He says that he takes 1mg BID and then 2mg QHS for sleep
Alprazolam 2mg PO BID prescribed at [facility] last picked up 30 day supply 2/08. He says that he takes 1mg BID and then 2mg QHS for sleep

## 2025-02-21 NOTE — PROGRESS NOTE ADULT - PROBLEM SELECTOR PLAN 2
Nutrition consult  Mirtazepine 7.5mg PO qhs
Nutrition consult  - Mirtazepine 7.5mg PO qhs  - barium esophagram wnl
Nutrition consult  - Mirtazepine 7.5mg PO qhs - can increase to 15mg qHS on d/c  - barium esophagram wnl
Nutrition consult  Mirtazepine 7.5mg PO qhs
Nutrition consult  Mirtazepine 7.5mg PO qhs  Barium swallow , NPO MN
Nutrition consult  - Mirtazepine 7.5mg PO qhs  - f/u barium swallow results
Nutrition consult  - Mirtazepine 7.5mg PO qhs - can increase to 15mg qHS on d/c  - barium esophagram wnl
Nutrition consult  Mirtazepine 7.5mg PO qhs

## 2025-02-21 NOTE — PROGRESS NOTE ADULT - NSPROGADDITIONALINFOA_GEN_ALL_CORE
.  Arelis Perez MD  Division of Hospital Medicine  [facility]   Available on [employer] Teams - messages preferred prior to calls.    Medically clear for discharge home on 2/20/25 with outpatient f/u with Dr. Noriega at [place] and Dr. Liu and Dr. Henriquez outpatient.  Patient to have CT Angio abd/pelv performed next week as outpatient. rx to be provided by his outpatient GI office.  CDs of inpatient imaging made and provided to patient provided to patient prior to d/c.  Discharge planning time spent: 45 minutes.  ----  However, patient remained in the hospital, insisting on having above Surgical Attendings speak to him in person bedside even though we explained he was to follow-up outpatient  24 hour discharge notice given.    Plan discussed with patient extensively, wife Marguerite via phone on 2/20, Nurse Manager Tabatha Brown, and medicine NP Tyler.

## 2025-02-21 NOTE — PROGRESS NOTE ADULT - SUBJECTIVE AND OBJECTIVE BOX
Arelis Perez MD  Division of Hospital Medicine  [facility]   Available on [employer] Teams (Mon-Fri 8am-5pm)    * messages preferred prior to calls  Other Times:  [phone]      Patient is a 44y old  Male who presents with a chief complaint of Abdominal Pain (20 Feb 2025 16:18)      SUBJECTIVE / OVERNIGHT EVENTS: patient was to be discharged yesterday but stayed continuing to insist on speaking to surgical attending in person which was explained to him extensively. discharge notice to be given today.  ADDITIONAL REVIEW OF SYSTEMS:    MEDICATIONS  (STANDING):  ALPRAZolam 2 milliGRAM(s) Oral at bedtime  Beano Supplement Capsule 1 Capsule(s) 1 Capsule(s) Oral three times a day before meals  dicyclomine 10 milliGRAM(s) Oral three times a day before meals  famotidine    Tablet 40 milliGRAM(s) Oral daily  gabapentin 300 milliGRAM(s) Oral three times a day  influenza   Vaccine 0.5 milliLiter(s) IntraMuscular once  mirtazapine 7.5 milliGRAM(s) Oral at bedtime  multivitamin 1 Tablet(s) Oral daily  pantoprazole    Tablet 40 milliGRAM(s) Oral two times a day  sertraline 50 milliGRAM(s) Oral daily  tamsulosin 0.4 milliGRAM(s) Oral at bedtime    MEDICATIONS  (PRN):  acetaminophen     Tablet .. 650 milliGRAM(s) Oral every 6 hours PRN Temp greater or equal to 38C (100.4F), Mild Pain (1 - 3)  aluminum hydroxide/magnesium hydroxide/simethicone Suspension 30 milliLiter(s) Oral every 4 hours PRN Dyspepsia  melatonin 3 milliGRAM(s) Oral at bedtime PRN Insomnia  ondansetron Injectable 4 milliGRAM(s) IV Push every 8 hours PRN Nausea and/or Vomiting      CAPILLARY BLOOD GLUCOSE        I&O's Summary    20 Feb 2025 07:01  -  21 Feb 2025 07:00  --------------------------------------------------------  IN: 1200 mL / OUT: 2 mL / NET: 1198 mL    21 Feb 2025 07:01  -  21 Feb 2025 14:29  --------------------------------------------------------  IN: 480 mL / OUT: 0 mL / NET: 480 mL        PHYSICAL EXAM:  Vital Signs Last 24 Hrs  T(C): 36.4 (21 Feb 2025 08:58), Max: 36.5 (20 Feb 2025 19:51)  T(F): 97.5 (21 Feb 2025 08:58), Max: 97.7 (20 Feb 2025 19:51)  HR: 92 (21 Feb 2025 08:58) (74 - 93)  BP: 102/64 (21 Feb 2025 08:58) (102/63 - 106/70)  BP(mean): --  RR: 18 (21 Feb 2025 08:58) (18 - 18)  SpO2: 97% (21 Feb 2025 08:58) (97% - 98%)    Parameters below as of 21 Feb 2025 08:58  Patient On (Oxygen Delivery Method): room air    CONSTITUTIONAL: thin, emaciated male in NAD  EYES: PERRLA; conjunctiva and sclera clear  ENMT: Moist oral mucosa, no pharyngeal injection or exudates; normal dentition  NECK: Supple, no palpable masses; no thyromegaly  RESPIRATORY: Normal respiratory effort; lungs are clear to auscultation bilaterally  CARDIOVASCULAR: Regular rate and rhythm, normal S1 and S2, no murmur/rub/gallop; No lower extremity edema  ABDOMEN: Soft, Nondistended, nontender to palpation on exam, normoactive bowel sounds  MUSCULOSKELETAL: No clubbing or cyanosis of digits; no joint swelling or tenderness to palpation  PSYCH: A+O to person, place, and time; affect appropriate, anxious  NEUROLOGY: CN 2-12 are intact and symmetric; no gross sensory deficits   SKIN: No rashes; no palpable lesions    LABS:          RADIOLOGY & ADDITIONAL TESTS:  Results Reviewed:   Imaging Personally Reviewed:  Electrocardiogram Personally Reviewed:    COORDINATION OF CARE:  Care Discussed with Consultants/Other Providers [Y]: nurse manager Tabatha Brown, medicine NP Zeta  Prior or Outpatient Records Reviewed [Y/N]:

## 2025-02-21 NOTE — PROGRESS NOTE ADULT - NUTRITIONAL ASSESSMENT
This patient has been assessed with a concern for Malnutrition and has been determined to have a diagnosis/diagnoses of Severe protein-calorie malnutrition and Underweight (BMI < 19).    This patient is being managed with:   Diet Regular-  Entered: Feb 13 2025  7:14PM  
This patient has been assessed with a concern for Malnutrition and has been determined to have a diagnosis/diagnoses of Severe protein-calorie malnutrition and Underweight (BMI < 19).    This patient is being managed with:   Diet NPO after Midnight-     NPO Start Date: 17-Feb-2025   NPO Start Time: 23:59  Except Medications  Entered: Feb 17 2025  5:52PM    Diet Regular-  Entered: Feb 13 2025  7:14PM  
This patient has been assessed with a concern for Malnutrition and has been determined to have a diagnosis/diagnoses of Severe protein-calorie malnutrition and Underweight (BMI < 19).    This patient is being managed with:   Diet Regular-  Entered: Feb 13 2025  7:14PM  

## 2025-02-21 NOTE — PROGRESS NOTE ADULT - TIME BILLING
- Ordering, reviewing, and interpreting labs, testing, and imaging.  - Independently obtaining a review of systems and performing a physical exam  - Reviewing consultant documentation/recommendations.  - Counselling and educating patient regarding interpretation of aforementioned items and plan of care.
- Ordering, reviewing, and interpreting labs, testing, and imaging.  - Independently obtaining a review of systems and performing a physical exam  - Reviewing consultant documentation/recommendations.  - Counselling and educating patient regarding interpretation of aforementioned items and plan of care.
- Reviewing, and interpreting labs and testing.  - Independently obtaining a review of systems and performing a physical exam  - Reviewing consultant documentation/recommendations in addition to discussing plan of care with consultants.  - Counselling and educating patient and family regarding interpretation of aforementioned items and plan of care.
- Reviewing, and interpreting labs and testing.  - Independently obtaining a review of systems and performing a physical exam  - Reviewing consultant documentation/recommendations in addition to discussing plan of care with consultants.  - Counselling and educating patient and family regarding interpretation of aforementioned items and plan of care.
- Ordering, reviewing, and interpreting labs, testing, and imaging.  - Independently obtaining a review of systems and performing a physical exam  - Reviewing consultant documentation/recommendations.  - Counselling and educating patient regarding interpretation of aforementioned items and plan of care.

## 2025-02-21 NOTE — DISCHARGE NOTE NURSING/CASE MANAGEMENT/SOCIAL WORK - NSDCPEFALRISK_GEN_ALL_CORE
For information on Fall & Injury Prevention, visit: [email or web address] OR  [email or web address] OR  https://www.cdc.gov/steadi/patient.html

## 2025-02-21 NOTE — PROGRESS NOTE ADULT - PROVIDER SPECIALTY LIST ADULT
Surgery
Hospitalist
Surgery
Gastroenterology
Hospitalist

## 2025-02-21 NOTE — DISCHARGE NOTE NURSING/CASE MANAGEMENT/SOCIAL WORK - FINANCIAL ASSISTANCE
[facility] provides services at a reduced cost to those who are determined to be eligible through [facility]’s financial assistance program. Information regarding [facility]’s financial assistance program can be found by going to [email or web address] or by calling [phone].

## 2025-02-24 PROBLEM — Z87.19 HISTORY OF IRRITABLE BOWEL SYNDROME: Status: RESOLVED | Noted: 2023-10-02 | Resolved: 2025-02-24

## 2025-02-24 PROBLEM — Z86.19 HISTORY OF CLOSTRIDIOIDES DIFFICILE COLITIS: Status: RESOLVED | Noted: 2023-10-02 | Resolved: 2025-02-24

## 2025-02-24 PROBLEM — K55.1 SMAS (SUPERIOR MESENTERIC ARTERY SYNDROME): Status: ACTIVE | Noted: 2025-02-24

## 2025-02-24 PROBLEM — F11.11 HISTORY OF OPIOID ABUSE: Status: RESOLVED | Noted: 2023-10-02 | Resolved: 2025-02-24

## 2025-02-24 PROBLEM — F41.9 ANXIETY AND DEPRESSION: Status: RESOLVED | Noted: 2023-10-02 | Resolved: 2025-02-24

## 2025-02-26 ENCOUNTER — NON-APPOINTMENT (OUTPATIENT)
Age: 45
End: 2025-02-26

## 2025-02-27 ENCOUNTER — NON-APPOINTMENT (OUTPATIENT)
Age: 45
End: 2025-02-27

## 2025-02-27 ENCOUNTER — APPOINTMENT (OUTPATIENT)
Dept: VASCULAR SURGERY | Facility: CLINIC | Age: 45
End: 2025-02-27
Payer: COMMERCIAL

## 2025-02-27 VITALS — BODY MASS INDEX: 17.92 KG/M2 | HEIGHT: 69 IN | WEIGHT: 121 LBS

## 2025-02-27 PROCEDURE — 93975 VASCULAR STUDY: CPT

## 2025-02-27 PROCEDURE — 99205 OFFICE O/P NEW HI 60 MIN: CPT

## 2025-03-03 ENCOUNTER — APPOINTMENT (OUTPATIENT)
Dept: SURGERY | Facility: CLINIC | Age: 45
End: 2025-03-03

## 2025-03-03 ENCOUNTER — APPOINTMENT (OUTPATIENT)
Dept: SURGERY | Facility: CLINIC | Age: 45
End: 2025-03-03
Payer: COMMERCIAL

## 2025-03-03 VITALS
WEIGHT: 120 LBS | SYSTOLIC BLOOD PRESSURE: 120 MMHG | TEMPERATURE: 97.4 F | DIASTOLIC BLOOD PRESSURE: 74 MMHG | OXYGEN SATURATION: 96 % | HEIGHT: 69 IN | BODY MASS INDEX: 17.77 KG/M2 | HEART RATE: 67 BPM

## 2025-03-03 VITALS
SYSTOLIC BLOOD PRESSURE: 126 MMHG | HEIGHT: 69 IN | BODY MASS INDEX: 18.22 KG/M2 | WEIGHT: 123 LBS | DIASTOLIC BLOOD PRESSURE: 71 MMHG | HEART RATE: 60 BPM

## 2025-03-03 DIAGNOSIS — I77.4 CELIAC ARTERY COMPRESSION SYNDROME: ICD-10-CM

## 2025-03-03 DIAGNOSIS — K55.1 CHRONIC VASCULAR DISORDERS OF INTESTINE: ICD-10-CM

## 2025-03-03 PROCEDURE — 99205 OFFICE O/P NEW HI 60 MIN: CPT

## 2025-03-03 PROCEDURE — 99214 OFFICE O/P EST MOD 30 MIN: CPT

## 2025-03-23 ENCOUNTER — INPATIENT (INPATIENT)
Facility: HOSPITAL | Age: 45
LOS: 2 days | Discharge: HOME | DRG: 641 | End: 2025-03-26
Attending: STUDENT IN AN ORGANIZED HEALTH CARE EDUCATION/TRAINING PROGRAM | Admitting: HOSPITALIST
Payer: COMMERCIAL

## 2025-03-23 VITALS
RESPIRATION RATE: 18 BRPM | OXYGEN SATURATION: 96 % | TEMPERATURE: 97 F | HEIGHT: 69 IN | WEIGHT: 119.93 LBS | SYSTOLIC BLOOD PRESSURE: 116 MMHG | HEART RATE: 80 BPM | DIASTOLIC BLOOD PRESSURE: 77 MMHG

## 2025-03-23 LAB
ALBUMIN SERPL ELPH-MCNC: 4.7 G/DL — SIGNIFICANT CHANGE UP (ref 3.3–5)
ALP SERPL-CCNC: 39 U/L — LOW (ref 40–120)
ALT FLD-CCNC: 17 U/L — SIGNIFICANT CHANGE UP (ref 10–45)
ANION GAP SERPL CALC-SCNC: 13 MMOL/L — SIGNIFICANT CHANGE UP (ref 5–17)
AST SERPL-CCNC: 17 U/L — SIGNIFICANT CHANGE UP (ref 10–40)
BASOPHILS # BLD AUTO: 0.03 K/UL — SIGNIFICANT CHANGE UP (ref 0–0.2)
BASOPHILS NFR BLD AUTO: 0.6 % — SIGNIFICANT CHANGE UP (ref 0–2)
BILIRUB SERPL-MCNC: 0.4 MG/DL — SIGNIFICANT CHANGE UP (ref 0.2–1.2)
BUN SERPL-MCNC: 12 MG/DL — SIGNIFICANT CHANGE UP (ref 7–23)
CALCIUM SERPL-MCNC: 10.2 MG/DL — SIGNIFICANT CHANGE UP (ref 8.4–10.5)
CHLORIDE SERPL-SCNC: 104 MMOL/L — SIGNIFICANT CHANGE UP (ref 96–108)
CO2 SERPL-SCNC: 26 MMOL/L — SIGNIFICANT CHANGE UP (ref 22–31)
CREAT SERPL-MCNC: 0.63 MG/DL — SIGNIFICANT CHANGE UP (ref 0.5–1.3)
EGFR: 121 ML/MIN/1.73M2 — SIGNIFICANT CHANGE UP
EGFR: 121 ML/MIN/1.73M2 — SIGNIFICANT CHANGE UP
EOSINOPHIL # BLD AUTO: 0.05 K/UL — SIGNIFICANT CHANGE UP (ref 0–0.5)
EOSINOPHIL NFR BLD AUTO: 1 % — SIGNIFICANT CHANGE UP (ref 0–6)
GAS PNL BLDV: SIGNIFICANT CHANGE UP
GLUCOSE SERPL-MCNC: 81 MG/DL — SIGNIFICANT CHANGE UP (ref 70–99)
HCT VFR BLD CALC: 40.5 % — SIGNIFICANT CHANGE UP (ref 39–50)
HGB BLD-MCNC: 13.8 G/DL — SIGNIFICANT CHANGE UP (ref 13–17)
IMM GRANULOCYTES NFR BLD AUTO: 0.2 % — SIGNIFICANT CHANGE UP (ref 0–0.9)
LYMPHOCYTES # BLD AUTO: 0.87 K/UL — LOW (ref 1–3.3)
LYMPHOCYTES # BLD AUTO: 17.4 % — SIGNIFICANT CHANGE UP (ref 13–44)
MAGNESIUM SERPL-MCNC: 2 MG/DL — SIGNIFICANT CHANGE UP (ref 1.6–2.6)
MCHC RBC-ENTMCNC: 29.6 PG — SIGNIFICANT CHANGE UP (ref 27–34)
MCHC RBC-ENTMCNC: 34.1 G/DL — SIGNIFICANT CHANGE UP (ref 32–36)
MCV RBC AUTO: 86.9 FL — SIGNIFICANT CHANGE UP (ref 80–100)
MONOCYTES # BLD AUTO: 0.58 K/UL — SIGNIFICANT CHANGE UP (ref 0–0.9)
MONOCYTES NFR BLD AUTO: 11.6 % — SIGNIFICANT CHANGE UP (ref 2–14)
NEUTROPHILS # BLD AUTO: 3.46 K/UL — SIGNIFICANT CHANGE UP (ref 1.8–7.4)
NEUTROPHILS NFR BLD AUTO: 69.2 % — SIGNIFICANT CHANGE UP (ref 43–77)
NRBC BLD AUTO-RTO: 0 /100 WBCS — SIGNIFICANT CHANGE UP (ref 0–0)
PHOSPHATE SERPL-MCNC: 2.7 MG/DL — SIGNIFICANT CHANGE UP (ref 2.5–4.5)
PLATELET # BLD AUTO: 176 K/UL — SIGNIFICANT CHANGE UP (ref 150–400)
POTASSIUM SERPL-MCNC: 4.5 MMOL/L — SIGNIFICANT CHANGE UP (ref 3.5–5.3)
POTASSIUM SERPL-SCNC: 4.5 MMOL/L — SIGNIFICANT CHANGE UP (ref 3.5–5.3)
PROT SERPL-MCNC: 6.7 G/DL — SIGNIFICANT CHANGE UP (ref 6–8.3)
RBC # BLD: 4.66 M/UL — SIGNIFICANT CHANGE UP (ref 4.2–5.8)
RBC # FLD: 12.4 % — SIGNIFICANT CHANGE UP (ref 10.3–14.5)
SODIUM SERPL-SCNC: 143 MMOL/L — SIGNIFICANT CHANGE UP (ref 135–145)
WBC # BLD: 5 K/UL — SIGNIFICANT CHANGE UP (ref 3.8–10.5)
WBC # FLD AUTO: 5 K/UL — SIGNIFICANT CHANGE UP (ref 3.8–10.5)

## 2025-03-23 PROCEDURE — 99285 EMERGENCY DEPT VISIT HI MDM: CPT

## 2025-03-23 PROCEDURE — 74174 CTA ABD&PLVS W/CONTRAST: CPT | Mod: 26

## 2025-03-23 RX ORDER — MIRTAZAPINE 30 MG/1
15 TABLET, FILM COATED ORAL ONCE
Refills: 0 | Status: COMPLETED | OUTPATIENT
Start: 2025-03-23 | End: 2025-03-23

## 2025-03-23 RX ORDER — GABAPENTIN 400 MG/1
300 CAPSULE ORAL ONCE
Refills: 0 | Status: COMPLETED | OUTPATIENT
Start: 2025-03-23 | End: 2025-03-23

## 2025-03-23 RX ORDER — ALPRAZOLAM 0.5 MG
2 TABLET, EXTENDED RELEASE 24 HR ORAL ONCE
Refills: 0 | Status: DISCONTINUED | OUTPATIENT
Start: 2025-03-23 | End: 2025-03-23

## 2025-03-23 RX ORDER — TAMSULOSIN HYDROCHLORIDE 0.4 MG/1
0.4 CAPSULE ORAL ONCE
Refills: 0 | Status: COMPLETED | OUTPATIENT
Start: 2025-03-23 | End: 2025-03-23

## 2025-03-23 RX ORDER — ACETAMINOPHEN 500 MG/5ML
1000 LIQUID (ML) ORAL ONCE
Refills: 0 | Status: COMPLETED | OUTPATIENT
Start: 2025-03-23 | End: 2025-03-23

## 2025-03-23 RX ADMIN — MIRTAZAPINE 15 MILLIGRAM(S): 30 TABLET, FILM COATED ORAL at 22:30

## 2025-03-23 RX ADMIN — Medication 400 MILLIGRAM(S): at 22:12

## 2025-03-23 RX ADMIN — GABAPENTIN 300 MILLIGRAM(S): 400 CAPSULE ORAL at 22:29

## 2025-03-23 RX ADMIN — Medication 1000 MILLILITER(S): at 22:11

## 2025-03-23 RX ADMIN — TAMSULOSIN HYDROCHLORIDE 0.4 MILLIGRAM(S): 0.4 CAPSULE ORAL at 22:29

## 2025-03-23 RX ADMIN — Medication 2 MILLIGRAM(S): at 22:29

## 2025-03-23 RX ADMIN — Medication 1000 MILLIGRAM(S): at 23:57

## 2025-03-23 NOTE — ED ADULT TRIAGE NOTE - CHIEF COMPLAINT QUOTE
abdominal pain, constipation and decreased PO intake x 1.5 weeks with worsening x last night.   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 was diagnosed with SMA and MALS.

## 2025-03-23 NOTE — ED ADULT NURSE NOTE - HISTORY OF COVID-19 VACCINATION
Does not meet SIRS or sepsis criteria but suspect in light of infection or dehydration.  Was given fluids repeat lactic acid antibiotics have been started   Vaccine status unknown

## 2025-03-23 NOTE — ED ADULT NURSE NOTE - OBJECTIVE STATEMENT
Pt is a 43 y/o male PMH SMA, MALS and  c. diff (5 times last year) presents to the ED c/o abdominal pain. Pt states has been hospitalized many times over the past few months d/t abdominal pain and weight loss. Pt was dx with SMA but was able to eat more and gain more weight over the past 2 months d/t being on Bentyl. Pt states the past few days, he has felt constipated, able to pass gas but has not had a BM since Tuesday (3/18). He has not been able to eat d/t abdominal pain and states over the past month he has been losing weight again. Pt reports losing 10lbs over the past month. Upon assessment, pt abdomen is very rigid and painful to palpation. Pt has bowel sounds but they are painful. Pt states the abdominal pain is radiating to chest and upper back. Pt denies SOB, HA/vision changes, urinary changes, N/V/D, sensory loss or fever/chills.

## 2025-03-23 NOTE — ED ADULT TRIAGE NOTE - HEART RATE (BEATS/MIN)
Remain working - Asymptomatic team member - Serial Antigen Testing    Dear Danette Barnett     Per your recent telephone screening with the Employee [facility] COVID Team:    · Your lab appointment(s) were scheduled for Serial Antigen testing at the location noted below.  · NO children, visitors. or pets are allowed at any of the testing sites.  · Please follow directions for site location provided below and always be vigilant with PPE at our testing locations.  · Complete the symptom tracker daily on [facility] to comply with your actively monitoring status.  · Your appointment(s) are scheduled at:   · Appointment Date:  12/9 1300, 12/14 1015, 12/18 1515  · Location:   [address] [address]    General Instructions:  Please arrive on time for your appointment.    No children, pets, or visitors are allowed at any testing site.  Be vigilant with PPE and observe social distancing.  If you were asymptomatic and have now developed COVID-19 symptoms prior to your scheduled appointment, do not come into the building.  Contact [facility] for further directions.  If your symptoms have been ruled out for COVID-19 through PCR testing, you may enter the building to complete your serial antigen testing.    Arrival Instructions:  Park in the parking structure or surface parking lot.    Enter the hospital main entrance.    Follow the pink tiles on floor to the bank of elevators.    Take the elevator to the ground floor.    Follow signs to Employee Health located on the ground floor across from the Pharmacy.   ·     WHILE YOU REMAIN WORKING, YOU MUST ADHERE TO VIGILANT USE of PPE and social distancing in break rooms, nursing stations, offices etc.      Employee Health will contact you with a positive result only; however, all your COVID-19 test results may be viewed in your [facility] brendon.      If your test result is positive, you will be notified within  the hour using the phone number you provided. You will need to be removed from work immediately. A confirmatory PCR test is required after a positive antigen test. Notify your supervisor immediately that you must be removed from the work schedule. Additional follow up is dependent on the confirmatory PCR test results.     If you develop symptoms, do NOT report to work and notify your leader.  Call the [facility] COVID Team at [phone] or complete the COVID 19 Exposure Evaluation tool on the COVID-19 Information Center. Someone from our team will contact you with further guidance and testing if indicated.     Thank you,    [facility] COVID Team    [phone]    Hours: M-F [id number]; Saturday . Please answer your phone if an outside line is calling, regardless of hours. We work 7 days a week and will follow up as appropriate.     [email or web address]    Cc: Manager     81

## 2025-03-23 NOTE — ED ADULT NURSE REASSESSMENT NOTE - NS ED NURSE REASSESS COMMENT FT1
Pt bladder scanned - bladder scan showing 5 mL. Pt reports urinating prior to coming to ED. No urinary symptoms at this time.

## 2025-03-23 NOTE — ED ADULT NURSE NOTE - NSFALLUNIVINTERV_ED_ALL_ED
Bed/Stretcher in lowest position, wheels locked, appropriate side rails in place/Call bell, personal items and telephone in reach/Instruct patient to call for assistance before getting out of bed/chair/stretcher/Non-slip footwear applied when patient is off stretcher/[place] to call system/Physically safe environment - no spills, clutter or unnecessary equipment/Purposeful proactive rounding/Room/bathroom lighting operational, light cord in reach

## 2025-03-24 DIAGNOSIS — R63.4 ABNORMAL WEIGHT LOSS: ICD-10-CM

## 2025-03-24 DIAGNOSIS — R62.7 ADULT FAILURE TO THRIVE: ICD-10-CM

## 2025-03-24 DIAGNOSIS — N40.0 BENIGN PROSTATIC HYPERPLASIA WITHOUT LOWER URINARY TRACT SYMPTOMS: ICD-10-CM

## 2025-03-24 DIAGNOSIS — F41.9 ANXIETY DISORDER, UNSPECIFIED: ICD-10-CM

## 2025-03-24 DIAGNOSIS — A04.72 ENTEROCOLITIS DUE TO CLOSTRIDIUM DIFFICILE, NOT SPECIFIED AS RECURRENT: ICD-10-CM

## 2025-03-24 DIAGNOSIS — E44.0 MODERATE PROTEIN-CALORIE MALNUTRITION: ICD-10-CM

## 2025-03-24 DIAGNOSIS — Z29.9 ENCOUNTER FOR PROPHYLACTIC MEASURES, UNSPECIFIED: ICD-10-CM

## 2025-03-24 LAB
APPEARANCE UR: CLEAR — SIGNIFICANT CHANGE UP
BACTERIA # UR AUTO: NEGATIVE /HPF — SIGNIFICANT CHANGE UP
BILIRUB UR-MCNC: NEGATIVE — SIGNIFICANT CHANGE UP
CAST: 0 /LPF — SIGNIFICANT CHANGE UP (ref 0–4)
COLOR SPEC: YELLOW — SIGNIFICANT CHANGE UP
CULTURE RESULTS: SIGNIFICANT CHANGE UP
DIFF PNL FLD: NEGATIVE — SIGNIFICANT CHANGE UP
GLUCOSE UR QL: NEGATIVE MG/DL — SIGNIFICANT CHANGE UP
KETONES UR-MCNC: NEGATIVE MG/DL — SIGNIFICANT CHANGE UP
LEUKOCYTE ESTERASE UR-ACNC: NEGATIVE — SIGNIFICANT CHANGE UP
NITRITE UR-MCNC: NEGATIVE — SIGNIFICANT CHANGE UP
PH UR: 7.5 — SIGNIFICANT CHANGE UP (ref 5–8)
PROT UR-MCNC: 30 MG/DL
RBC CASTS # UR COMP ASSIST: 0 /HPF — SIGNIFICANT CHANGE UP (ref 0–4)
REVIEW: SIGNIFICANT CHANGE UP
SP GR SPEC: >=1.099 (ref 1–1.03)
SPECIMEN SOURCE: SIGNIFICANT CHANGE UP
SPERM AB SPEC-ACNC: PRESENT
SQUAMOUS # UR AUTO: 0 /HPF — SIGNIFICANT CHANGE UP (ref 0–5)
UROBILINOGEN FLD QL: 1 MG/DL — SIGNIFICANT CHANGE UP (ref 0.2–1)
WBC UR QL: 1 /HPF — SIGNIFICANT CHANGE UP (ref 0–5)

## 2025-03-24 PROCEDURE — 99223 1ST HOSP IP/OBS HIGH 75: CPT | Mod: GC

## 2025-03-24 PROCEDURE — 93010 ELECTROCARDIOGRAM REPORT: CPT

## 2025-03-24 RX ORDER — GABAPENTIN 400 MG/1
300 CAPSULE ORAL THREE TIMES A DAY
Refills: 0 | Status: DISCONTINUED | OUTPATIENT
Start: 2025-03-24 | End: 2025-03-26

## 2025-03-24 RX ORDER — B1/B2/B3/B5/B6/B12/VIT C/FOLIC 500-0.5 MG
1 TABLET ORAL DAILY
Refills: 0 | Status: DISCONTINUED | OUTPATIENT
Start: 2025-03-24 | End: 2025-03-26

## 2025-03-24 RX ORDER — MIRTAZAPINE 30 MG/1
15 TABLET, FILM COATED ORAL AT BEDTIME
Refills: 0 | Status: DISCONTINUED | OUTPATIENT
Start: 2025-03-24 | End: 2025-03-26

## 2025-03-24 RX ORDER — NICOTINE POLACRILEX 4 MG/1
1 GUM, CHEWING ORAL ONCE
Refills: 0 | Status: COMPLETED | OUTPATIENT
Start: 2025-03-24 | End: 2025-03-24

## 2025-03-24 RX ORDER — MIRTAZAPINE 30 MG/1
1 TABLET, FILM COATED ORAL
Refills: 0 | DISCHARGE

## 2025-03-24 RX ORDER — ALPRAZOLAM 0.5 MG
2 TABLET, EXTENDED RELEASE 24 HR ORAL AT BEDTIME
Refills: 0 | Status: DISCONTINUED | OUTPATIENT
Start: 2025-03-24 | End: 2025-03-26

## 2025-03-24 RX ORDER — ALPRAZOLAM 0.5 MG
1 TABLET, EXTENDED RELEASE 24 HR ORAL
Refills: 0 | Status: DISCONTINUED | OUTPATIENT
Start: 2025-03-24 | End: 2025-03-26

## 2025-03-24 RX ORDER — TAMSULOSIN HYDROCHLORIDE 0.4 MG/1
0.4 CAPSULE ORAL AT BEDTIME
Refills: 0 | Status: DISCONTINUED | OUTPATIENT
Start: 2025-03-24 | End: 2025-03-26

## 2025-03-24 RX ORDER — FLUTICASONE PROPIONATE 50 UG/1
2 SPRAY, METERED NASAL
Refills: 0 | Status: DISCONTINUED | OUTPATIENT
Start: 2025-03-24 | End: 2025-03-26

## 2025-03-24 RX ORDER — POLYETHYLENE GLYCOL 3350 17 G/17G
17 POWDER, FOR SOLUTION ORAL DAILY
Refills: 0 | Status: DISCONTINUED | OUTPATIENT
Start: 2025-03-24 | End: 2025-03-26

## 2025-03-24 RX ORDER — MONTELUKAST SODIUM 10 MG/1
1 TABLET ORAL
Refills: 0 | DISCHARGE

## 2025-03-24 RX ORDER — ACETAMINOPHEN 500 MG/5ML
1000 LIQUID (ML) ORAL ONCE
Refills: 0 | Status: COMPLETED | OUTPATIENT
Start: 2025-03-24 | End: 2025-03-24

## 2025-03-24 RX ORDER — SODIUM CHLORIDE 9 G/1000ML
1000 INJECTION, SOLUTION INTRAVENOUS
Refills: 0 | Status: DISCONTINUED | OUTPATIENT
Start: 2025-03-24 | End: 2025-03-25

## 2025-03-24 RX ORDER — METOCLOPRAMIDE HCL 10 MG
5 TABLET ORAL EVERY 6 HOURS
Refills: 0 | Status: DISCONTINUED | OUTPATIENT
Start: 2025-03-24 | End: 2025-03-26

## 2025-03-24 RX ORDER — SERTRALINE 100 MG/1
50 TABLET, FILM COATED ORAL DAILY
Refills: 0 | Status: DISCONTINUED | OUTPATIENT
Start: 2025-03-24 | End: 2025-03-26

## 2025-03-24 RX ADMIN — Medication 1 TABLET(S): at 11:21

## 2025-03-24 RX ADMIN — Medication 1 MILLIGRAM(S): at 11:36

## 2025-03-24 RX ADMIN — TAMSULOSIN HYDROCHLORIDE 0.4 MILLIGRAM(S): 0.4 CAPSULE ORAL at 22:05

## 2025-03-24 RX ADMIN — SERTRALINE 50 MILLIGRAM(S): 100 TABLET, FILM COATED ORAL at 11:22

## 2025-03-24 RX ADMIN — Medication 400 MILLIGRAM(S): at 22:32

## 2025-03-24 RX ADMIN — Medication 400 MILLIGRAM(S): at 14:36

## 2025-03-24 RX ADMIN — GABAPENTIN 300 MILLIGRAM(S): 400 CAPSULE ORAL at 22:06

## 2025-03-24 RX ADMIN — Medication 40 MILLIGRAM(S): at 11:22

## 2025-03-24 RX ADMIN — GABAPENTIN 300 MILLIGRAM(S): 400 CAPSULE ORAL at 11:36

## 2025-03-24 RX ADMIN — MIRTAZAPINE 15 MILLIGRAM(S): 30 TABLET, FILM COATED ORAL at 23:52

## 2025-03-24 RX ADMIN — Medication 10 MILLIGRAM(S): at 11:21

## 2025-03-24 RX ADMIN — Medication 2 MILLIGRAM(S): at 22:05

## 2025-03-24 RX ADMIN — SODIUM CHLORIDE 125 MILLILITER(S): 9 INJECTION, SOLUTION INTRAVENOUS at 03:58

## 2025-03-24 RX ADMIN — Medication 1000 MILLIGRAM(S): at 23:16

## 2025-03-24 NOTE — H&P ADULT - ATTENDING COMMENTS
44M with PMHx of prior C. Diff infection and ongoing abdominal pain which appears post-prandial presenting for acute-on-chronic abdominal pain. Patient describes a deep throbbing pain particular after meals in the epigastric region. He was admitted in February 2025 for same issue. He was seen by GI and surgery. Ultimately had an US Doppler which was concerning for MALS or SMA Syndrome. Started on bentyl with some relief. He eventually followed up outpatient with surgeons. Recommended for weight gain, small and frequent meals, and overall conservative management. Per patient outpatient surgeon leaning toward SMA.    VSS  Minimal tenderness to palpation    Labs personally reviewed    CT A&P without acute pathology    A/P:    - Ongoing abdominal pain, may be due to above. Per surgery no acute intervention  - Repeat Abdominal US to see if there is any interval change  - Anti-emetics PRN, diet as tolerated, Bentyl, Remeron, SSRI. and bowel regimen  - Patient is aware that as long as he tolerates some PO ultimately will have to follow up outpatient

## 2025-03-24 NOTE — ED PROVIDER NOTE - CLINICAL SUMMARY MEDICAL DECISION MAKING FREE TEXT BOX
44-year-old male history of prior C. difficile with fecal transplants, BPH, recent admission for concern for low flow SMA and MALS, presents with 1.5 weeks of progressively worsening abdominal pain radiating to chest, poor p.o. intake.  Patient was recently admitted in February and was able to tolerate solids after discharge.  States started to feel more bloated and talk to Dr. Weldon who advised to switch to liquid diet which is he has been able to tolerate but with increased pain and nausea.  Patient says pain is constant, unsure if worsens posprandial. States last bowel movement was 6 days ago with intermittent episodes of flatulence.  Patient also feels she is having difficulty urinating for the past few days with no hematuria.  When patient was discharged had outpatient CTA that appeared to be unremarkable although still concern for a low flow state of the SMA but still patent and less likely MALS.  Patient denies fevers, chest pain, shortness of breath, recent falls.     Patient afebrile, not tachycardic, not hypotensive, severely cachectic appearing, lungs clear, firm abdomen with diffuse abdominal tenderness but not peritonitic, mild bilateral lumbar paraspinal tenderness with no midline tenderness, no neurodeficits.  Will evaluate for electrolyte derangement, possible mesenteric ischemia.  Low concern for infectious etiology.  Labs, imaging, pain control, reassess.

## 2025-03-24 NOTE — CONSULT NOTE ADULT - SUBJECTIVE AND OBJECTIVE BOX
Acute Care Surgery Consultation    History of Present Illness  Mr. Rapp is a 44 year old man with a complex past medical history including C difficile infection resulting in significant weight loss for which he required fecal oral microbiota transplant (FOMT), recently admitted for malnutrition and poor oral intake (2/13-2/21/2025), now again presenting with inability to tolerate oral intake. Workup at that time raised possible concern for Median Arcuate Ligament Syndrome (MALS) and/or Superior Mesenteric Artery (SMA) syndrome. He was ultimately discharged with a plan for nutritional therapy targeted towards weight gain, and with referrals to vascular surgeon Dr. Carlos Henriquez and general surgeon Dr. Jerry Weldon. Though he was determined to have low likelihood of SMA syndrome and/or MALS on outpatient evaluation, he has continued to work closely with Dr. Weldon towards his goal of weight gain. He presents today with constipation, abdominal "tightness," and nausea. He denies associated vomiting, but he has had associated chest pain and shortness of breath. Surgery is consulted to reassess the possibility of MALS or SMA syndrome.   ___________________________________________  Vital Signs  T(C): 36.7 (03-24-25 @ 01:54), Max: 36.7 (03-24-25 @ 01:54)  HR: 82 (03-24-25 @ 01:54) (78 - 82)  BP: 98/65 (03-24-25 @ 01:54) (98/65 - 137/62)  RR: 17 (03-24-25 @ 01:54) (16 - 18)  SpO2: 99% (03-24-25 @ 01:54) (96% - 99%)    Height/Weight  Height (cm): 175.3 (03-23)  Weight (kg): 54.4 (03-23)  BMI (kg/m2): 17.7 (03-23)  BSA (m2): 1.66 (03-23)  ____________________________________________  Physical Exam  General: Resting supine in bed in no acute distress.   Neurologic: Alert, oriented, and appropriately responds to questions.   Psychiatric: Euthymic mood, nervous affect, linear thought process, appropriate thought content.   Cardiovascular: Warm and well-perfused.   Respiratory: Equal chest wall expansion bilaterally with no accessory muscle use, no tachypnea, and no grossly increased work of breathing on room air.   Abdomen: Soft and nondistended. Mild-moderate tenderness to palpation in the epigastrium. No rebound tenderness or guarding is elicited.   ____________________________________________  Laboratory Studies  CBC Basic (03-23 @ 22:10)  WBC: 5.00 Hgb: 13.8 Hct: 40.5 Plt: 176    Metabolic Panel (03-23 @ 22:10)  143  |  104  |  12  ----------------------------<  81  4.5   |  26  |  0.63  Ca: 10.2/Phos: 2.7/Magnesium: 2.0    Liver Chemistries (03-23 @ 22:10)  Total protein 6.7 | Albumin 4.7  TBili 0.4 | DBili x  AST 17 | ALT 17 | AlkPhos 39    Urinalysis (03-24 @ 01:13)  Physical: Color Yellow, Appearance Clear, Mucous x, Gross blood Negative  Analytic: SG >=1.099, pH 7.5  Cells: RBC 0, WBC 1  UTI markers: Bacteria Negative, Leukocyte esterase Negative, Nitrites Negative  Chemical: Glucose x, Ketones Negative, Protein 30, Urobilinogen 1.0, Bilirubin Negative  ____________________________________________  Imaging  CT Angio Abdomen and Pelvis w/ IV Cont (03-23 @ 23:05)  1.   Patent mesenteric, renal, aortoiliac vasculature.  2.   No evidence of median arcuate ligament syndrome.  3.   Elongated/enlarged liver.

## 2025-03-24 NOTE — CONSULT NOTE ADULT - ASSESSMENT
Mr. Rapp is a 44 year old man with a history of C difficile infection requiring FOMT who has subsequently developed chronic abdominal pain and early satiety. He has been seen by multiple surgeons due to concern for possible MALS or SMA syndrome, however review of both inpatient and outpatient records indicates that there has been low surgical concern for acute intra-abdominal pathology warranting operative exploration or intervention.     Recommendations  - No surgical intervention planned at present. Present CT angiogram negative for acute pathology.   - Would admit to internal medicine for management of acute on chronic malnutrition.   - Acute care surgery to sign off. Please page with any further questions or concerns, or if patient's clinical status changes.     Case discussed with attending surgeon Dr. Anabel Russell.     Brad Bryant, PGY-2  Acute Care Surgery ([id number])

## 2025-03-24 NOTE — H&P ADULT - NSHPSOCIALHISTORY_GEN_ALL_CORE
Patient resides with wife who is a nurse and kids. Endorses nightly marijuana use and vaping.  Used to do recreational drugs in his 20s including mushrooms, LSD, ecstasy, cocaine. No hx of recreational opioid use.  Does not drink alcohol.

## 2025-03-24 NOTE — H&P ADULT - NSHPPHYSICALEXAM_GEN_ALL_CORE
T(C): 36.8 (03-24-25 @ 08:18), Max: 36.8 (03-24-25 @ 08:18)  HR: 77 (03-24-25 @ 08:18) (77 - 82)  BP: 108/74 (03-24-25 @ 08:18) (98/65 - 137/62)  RR: 16 (03-24-25 @ 08:18) (16 - 18)  SpO2: 99% (03-24-25 @ 08:18) (96% - 99%)    CONSTITUTIONAL: Well groomed, no apparent distress  EYES: PERRLA and symmetric, EOMI, No conjunctival or scleral injection, non-icteric  ENMT: Oral mucosa with moist membranes. Normal dentition; no pharyngeal injection or exudates             NECK: Supple, symmetric and without tracheal deviation   RESP: No respiratory distress, no use of accessory muscles; CTA b/l, no WRR  CV: RRR, +S1S2, no MRG; no JVD; no peripheral edema  GI: Soft, NT, ND, no rebound, no guarding; no palpable masses; no hepatosplenomegaly; no hernia palpated  LYMPH: No cervical LAD or tenderness; no axillary LAD or tenderness; no inguinal LAD or tenderness  MSK: Normal gait; No digital clubbing or cyanosis; examination of the (head/neck/spine/ribs/pelvis, RUE, LUE, RLE, LLE) without misalignment,            Normal ROM without pain, no spinal tenderness, normal muscle strength/tone  SKIN: No rashes or ulcers noted; no subcutaneous nodules or induration palpable  NEURO: CN II-XII intact; normal reflexes in upper and lower extremities, sensation intact in upper and lower extremities b/l to light touch   PSYCH: Appropriate insight/judgment; A+O x 3, mood and affect appropriate, recent/remote memory intact T(C): 36.8 (03-24-25 @ 08:18), Max: 36.8 (03-24-25 @ 08:18)  HR: 77 (03-24-25 @ 08:18) (77 - 82)  BP: 108/74 (03-24-25 @ 08:18) (98/65 - 137/62)  RR: 16 (03-24-25 @ 08:18) (16 - 18)  SpO2: 99% (03-24-25 @ 08:18) (96% - 99%)    CONSTITUTIONAL: Well groomed, cachetic with orbital fat pad and temporal wasting  EYES: PERRLA and symmetric, EOMI, No conjunctival or scleral injection, non-icteric  ENMT: Oral mucosa with moist membranes.   NECK: Supple  RESP: No respiratory distress, no use of accessory muscles; CTA b/l, no WRR  CV: RRR, +S1S2, no MRG; no JVD; no peripheral edema  GI: Soft, diffusely tender to palpation in all quadrants. No rigidity or guarding.   NEURO: A&Ox3. No focal deficits appreciated.    PSYCH: Appropriate insight/judgment; A+O x 3, mood and affect appropriate, recent/remote memory intact

## 2025-03-24 NOTE — CONSULT NOTE ADULT - ATTENDING COMMENTS
44 year old man with chronic illness and malnutrition, prior C.Diff infection s/p fecal transplant, with prior workup negative for median arcuate ligament syndrome, presenting with nausea, abdominal pain and constipation.     No evidence of median arcuate ligament syndrome on CTA.   If unable to tolerate enough oral feeds, may require nasojejunal feeding.   No surgical intervention indicated.     Please re-consult Surgery with any new questions, concerns or issues.       Anabel Russell M.D.  Department of Trauma, Acute Care Surgery and Surgical Critical Care

## 2025-03-24 NOTE — ED PROVIDER NOTE - ATTENDING CONTRIBUTION TO CARE
I have personally performed a face to face medical and diagnostic evaluation of the patient. I have discussed with and reviewed the Resident's and/or ACP's and/or Medical/PA/NP student's note and agree with the History, ROS, Physical Exam and MDM unless otherwise indicated. A brief summary of my personal evaluation and impression can be found below.     44M PMH prior C diff with fecal transplant, BPH, recent admission for concern for low flow SMA and MALS presents to ED with 1.5 weeks of progressively worsening generalized abd pain and inability to tolerate PO with significant weight loss. Reports nausea, no vomiting. No fever, chills, recent illness.     GENERAL: Awake. Alert. NAD. Cachectic appearing.  HEENT: NC/AT, Conjunctiva pink, no scleral icterus. Airway patent. Dry mucous membranes.  LUNGS: CTAB. No wheezes or rales noted.  CARDIAC: RRR.  ABDOMEN:  Soft, diffuse ttp, ND, no rebound, no guarding.  EXT: No edema, distal pulses 2+ bilaterally  NEURO: A&Ox3. Moving all extremities. Sensation and strength intact throughout.   SKIN: Warm and dry.   PSYCH: Normal affect.     Given hx and physical, ddx includes but is not limited to failure to thrive, metabolic derangement, SBO, low flow SMA, SMA occlusion possibly, pancreatitis, dehydration. Plan for cta, labs, ivf, meds, likely tba for failure to thrive.

## 2025-03-24 NOTE — H&P ADULT - PROBLEM SELECTOR PLAN 1
CT3/23: Patent mesenteric arterial vasculature without evidence of significant stenosis. Specifically, no clear evidence of compression of the celiac artery by the median arcuate ligament on this nondynamic CT exam. Correlate with the previous dynamic mesenteric ultrasound exam of 2/14/2025, which was performed during inspiration and expiration.  -Surgery consulted; No surgical intervention planned at present. Present CT angiogram negative for acute pathology.    Plan CT3/23: Patent mesenteric arterial vasculature without evidence of significant stenosis. Specifically, no clear evidence of compression of the celiac artery by the median arcuate ligament on this nondynamic CT exam. Correlate with the previous dynamic mesenteric ultrasound exam of 2/14/2025, which was performed during inspiration and expiration.  -Surgery consulted; No surgical intervention planned at present. Present CT angiogram negative for acute pathology.    Plan  -Bowel regimen: miralax qd, adjust as needed   -Repeat U/S of mesenteric vasculature to r/o SMA syndrome/MALS  -If negative, consider further workup with gastric emptying study and GI consult  -Full liquid diet; advance as tolerated   -IV tylenol PRN for pain   -Gabapentin 300 TID   -Reglan for nausea and to increase GI motility

## 2025-03-24 NOTE — H&P ADULT - PROBLEM SELECTOR PLAN 5
-Nutrition consulted  -Trend weight   -Trend I&Os   -Monitor BMP and watch for signs of refeeding syndrome  -Advance diet as tolerated  -Mirtazepine for appetite stimulation   -Reglan for nausea

## 2025-03-24 NOTE — H&P ADULT - PROBLEM SELECTOR PLAN 2
-s/p fecal transplant and treatment     -if patient develops worsening diarrhea, retest for C. difficile

## 2025-03-24 NOTE — PATIENT PROFILE ADULT - FALL HARM RISK - HARM RISK INTERVENTIONS
Assistance with ambulation/Assistance OOB with selected safe patient handling equipment/Communicate Risk of Fall with Harm to all staff/Provide patient with walking aids - walker, cane, crutches/Reinforce activity limits and safety measures with patient and family/Tailored Fall Risk Interventions/Visual Cue: Yellow wristband and red socks/Bed in lowest position, wheels locked, appropriate side rails in place/Call bell, personal items and telephone in reach/Instruct patient to call for assistance before getting out of bed or chair/Non-slip footwear when patient is out of bed/[place] to call system/Physically safe environment - no spills, clutter or unnecessary equipment/Purposeful Proactive Rounding/Room/bathroom lighting operational, light cord in reach

## 2025-03-24 NOTE — H&P ADULT - NSHPLABSRESULTS_GEN_ALL_CORE
LABS:                          13.8   5.00  )-----------( 176      ( 23 Mar 2025 22:10 )             40.5     Hb Trend: 13.8<--  WBC Trend: 5.00<--  Plt Trend: 176<--          03-23    143  |  104  |  12  ----------------------------<  81  4.5   |  26  |  0.63    Ca    10.2      23 Mar 2025 22:10  Phos  2.7     03-23  Mg     2.0     03-23    TPro  6.7  /  Alb  4.7  /  TBili  0.4  /  DBili  x   /  AST  17  /  ALT  17  /  AlkPhos  39[L]  03-23        Urinalysis Basic - ( 24 Mar 2025 01:13 )    Color: Yellow / Appearance: Clear / SG: >=1.099 / pH: x  Gluc: x / Ketone: Negative mg/dL  / Bili: Negative / Urobili: 1.0 mg/dL   Blood: x / Protein: 30 mg/dL / Nitrite: Negative   Leuk Esterase: Negative / RBC: 0 /HPF / WBC 1 /HPF   Sq Epi: x / Non Sq Epi: 0 /HPF / Bacteria: Negative /HPF      CAPILLARY BLOOD GLUCOSE              IMAGING:    OWER CHEST: Tree-in-bud nodularity left lower lobe has decreased since   2/13/2025. Left lower lobe nodule measuring 5 mm (303:64), previously 4   mm on 2/26/2025 and new since 2/13/2025.    LIVER: Elongated appearance of the right lobe measuring 21.5 cm   craniocaudally, which may represent Riedel's lobe (normal variant) or   hepatomegaly, and similar since 2/13/2025. Mild periportal edema, also   similar to 2/13/2025.  BILE DUCTS: Normal caliber.  GALLBLADDER: Within normal limits.  SPLEEN: Borderline enlarged, measures 13.6 cm, not significantly changed   since 2/13/2025.  PANCREAS: Within normal limits.  ADRENALS: Within normal limits.  KIDNEYS/URETERS: Within normal limits.    BLADDER: Underdistended.  REPRODUCTIVE ORGANS: Mildly enlarged prostate.    BOWEL: No evidence for bowel obstruction. The appendix is not visualized.  PERITONEUM/RETROPERITONEUM: Paucity of intra-abdominal fat.  VESSELS: Patent abdominal aorta without aneurysm or dissection. Patent   celiac artery and superior mesenteric artery without evidence of a   significant stenosis. No clear evidence of compression of the celiac   artery by the median arcuate ligament on this exam. The aortomesenteric   angle measures 25 degrees. The aortomesenteric distance at approximately   the level of the third segment of the duodenummeasures 14 mm. Patent   inferior mesenteric artery. Patent hepatic and portal veins. Patent   superior mesenteric vein and splenic vein.  LYMPH NODES: No lymphadenopathy.  ABDOMINAL WALL: Within normal limits.  BONES: Mild degenerative changes.    IMPRESSION:  *  Patent mesenteric arterial vasculature without evidence of significant   stenosis. Specifically, no clear evidence of compression of the celiac   artery by the median arcuate ligament on this nondynamic CT exam.   Correlate with the previous dynamic mesenteric ultrasound exam of   2/14/2025, which was performed during inspiration and expiration.  *  Decreased tree-in-bud nodularity in the left lower lobe since   2/13/2025. A left lower lobe nodule that measures 5 mm previously   measured 4 mm on 2/26/2025 and is new since 2/13/2025.  *  Elongated or enlarged liver with mild periportal edema, similar to   2/13/2025.  *  Borderline splenomegaly, also similar to 2/13/2025.

## 2025-03-24 NOTE — ED PROVIDER NOTE - PHYSICAL EXAMINATION
GEN: NAD. A&Ox3. Cachectic appearing  HEENT: normocephalic, atraumatic, EOMI, PERRL, moist MM  CARDIAC: RRR, S1, S2, no murmur. Radial pulses present and symmetric b/l  PULM: CTA B/L no wheeze, rhonchi, rales.   ABD: diffuse abd ttp, no CVA ttp  MSK: Moving all extremities, no edema, mild b/l lumbar paraspinal ttp  NEURO: no focal neurological deficits, CN 2-12 grossly intact  SKIN: warm, dry, no rash.

## 2025-03-24 NOTE — H&P ADULT - NSICDXPASTMEDICALHX_GEN_ALL_CORE_FT
PAST MEDICAL HISTORY:  Anxiety      PAST MEDICAL HISTORY:  Anxiety     BPH (benign prostatic hyperplasia)     H/O abdominal pain

## 2025-03-24 NOTE — H&P ADULT - HISTORY OF PRESENT ILLNESS
44-year-old male history of prior C. difficile with fecal transplants, BPH, recent admission for concern for low flow SMA and MALS, presents with 1.5 weeks of progressively worsening abdominal pain radiating to chest, poor p.o. intake.  Patient was recently admitted in February and was able to tolerate solids after discharge.   44-year-old male history of prior C. difficile with fecal transplants, BPH, recent admission for concern for low flow SMA and MALS, presents with 1.5 weeks of progressively worsening abdominal pain radiating to chest, poor p.o. intake.  Patient was recently admitted (02/13-02/21/2025) with a similar c/o abdominal pain w/ 40lbs of weight loss (weight on admission 104) that had been occurring over the past several months associated with post-prandial pain. This was after receiving a prolonged course of clindamycin for mastoiditis after which he was diagnosed with C. difficile. He regained most of the weight when he was discharge from the hospital. Pt was to follow up with Dr. Noriega at [place] for an outpatient consult and with Dr. Chad Schuster for CTA outpatient.     Esophogram was reviewed w/o any abnormalities.        man here for evaluation of possible Median Arcuate Ligament Syndrome (MALS) and Superior Mesenteric Artery Syndrome (SMA syndrome 44-year-old male history of prior C. difficile with fecal transplants, BPH, recent admission for concern for low flow SMA and MALS, presents with 1.5 weeks of progressively worsening abdominal pain radiating to back and poor PO intake.     According to the patient, he was in his usual state of health when he developed mastoiditis around 2 years ago requiring a course of abx. Patient was treated with clindamycin and subsequently developed C.Difficile colitis requiring fecal transplantation and abx. Patient was recently admitted (02/13-02/21/2025) for c/o abdominal pain w/ 40lbs of weight loss (weight on admission 104) that had been occurring over the past several months associated with post-prandial pain. A mesenteric duplex performed on 2/14 showed  a celiac artery w/ an elevated peak systolic velocity c/f  MALS and an aorto-mesenteric angle compatible with SMA syndrome.  Surgey determined at that time that there was no indication for acute inpatient management and the patient advised to followup w/ Dr. Liu and Dr. Noriega  as an outpatient.  He regained most of the weight when he was discharge from the hospital. Pt was to follow up with Dr. Noriega at [place] for an outpatient consult and with Dr. Chad Schuster for CTA outpatient. An esophagram ordered during that admission did not disclose any abnormalities. On 2/27,  a repeat duplex was ordered and based on the results, Dr. Henriquez determined there was no evidence of MALS or SMA. Dr. Liu recommended a CCK HIDA scan be done to rule out dyskinesia/hyperkinesia. Patient also saw Dr. Weldon who determined that no surgical intervention was warranted. All other workup, including endoscopy and colonoscopy (11/2024) were within normal limits, except for some hemorrhoids discovered on colonoscopy and some evidence of gastritis.  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 Patient's weight improved and he was discharged on the 2/21.     Patient states that since his discharge the dicyclomine was working for his diarrhea, but recently he has been experiencing more constipation and "gets backed up". He endorses some nausea with diffuse abdominal pain radiates to the back that has a non-specific description (sometime crampy, sometimes burning, sometimes stabbing). It is alleviated with hot packs. Worsened by eating and defecation. He denies blood BM. Denies vomiting. He sees two gastroenterologists for his GI issues, Dr. Saavedra and Yao George. Says all workup has been negative but perseverative on SMA. 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 Relatively f   44-year-old male history of prior C. difficile with fecal transplants, BPH, recent admission for concern for low flow SMA and MALS, presents with 1.5 weeks of progressively worsening abdominal pain radiating to back and poor PO intake.     According to the patient, he was in his usual state of health when he developed mastoiditis around 2 years ago requiring a course of abx. Patient was treated with clindamycin and subsequently developed C.Difficile colitis requiring fecal transplantation and abx. Patient was recently admitted (02/13-02/21/2025) for c/o abdominal pain w/ 40lbs of weight loss (weight on admission 104) that had been occurring over the past several months associated with post-prandial pain. A mesenteric duplex performed on 2/14 showed  a celiac artery w/ an elevated peak systolic velocity c/f  MALS and an aorto-mesenteric angle compatible with SMA syndrome.  Surgey determined at that time that there was no indication for acute inpatient management and the patient advised to followup w/ Dr. Liu and Dr. Noriega  as an outpatient.  He regained most of the weight when he was discharge from the hospital. Pt was to follow up with Dr. Noriega at [place] for an outpatient consult and with Dr. Chad Schuster for CTA outpatient. An esophagram ordered during that admission did not disclose any abnormalities. On 2/27,  a repeat duplex was ordered and based on the results, Dr. Henriquez determined there was no evidence of MALS or SMA. Dr. Liu recommended a CCK HIDA scan be done to rule out dyskinesia/hyperkinesia. Patient also saw Dr. Weldon who determined that no surgical intervention was warranted. All other workup, including endoscopy and colonoscopy (11/2024) were within normal limits, except for some hemorrhoids discovered on colonoscopy and some evidence of gastritis.  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 Patient's weight improved and he was discharged on the 2/21.     Patient states that since his discharge the dicyclomine was working for his diarrhea, but recently he has been experiencing more constipation and "gets backed up". He endorses some nausea with diffuse abdominal pain radiates to the back that has a non-specific description (sometime crampy, sometimes burning, sometimes stabbing). It is alleviated with hot packs. Worsened by eating and defecation. He denies blood BM. Denies vomiting. He sees two gastroenterologists for his GI issues, Dr. Saavedra and Yao George. Says all workup has been negative but perseverative on SMA. 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    44-year-old male history of prior C. difficile with fecal transplants, BPH, recent admission for concern for low flow SMA and MALS, presents with 1.5 weeks of progressively worsening abdominal pain radiating to back and poor PO intake.     According to the patient, he was in his usual state of health when he developed mastoiditis around 2 years ago requiring a course of abx. Patient was treated with clindamycin and subsequently developed C.Difficile colitis requiring fecal transplantation and abx. Patient was recently admitted (02/13-02/21/2025) for c/o abdominal pain w/ 40lbs of weight loss (weight on admission 104) that had been occurring over the past several months associated with post-prandial pain. A mesenteric duplex performed on 2/14 showed  a celiac artery w/ an elevated peak systolic velocity c/f  MALS and an aorto-mesenteric angle compatible with SMA syndrome.  Surgey determined at that time that there was no indication for acute inpatient management and the patient advised to followup w/ Dr. Liu and Dr. Noriega  as an outpatient.  He regained most of the weight when he was discharge from the hospital. Pt was to follow up with Dr. Noriega at [place] for an outpatient consult and with Dr. Chad Schuster for CTA outpatient. An esophagram ordered during that admission did not disclose any abnormalities. On 2/27,  a repeat duplex was ordered and based on the results, Dr. Henriquez determined there was no evidence of MALS or SMA. Dr. Liu recommended a CCK HIDA scan be done to rule out dyskinesia/hyperkinesia. Patient also saw Dr. Weldon who determined that no surgical intervention was warranted. All other workup, including endoscopy and colonoscopy (11/2024) were within normal limits, except for some hemorrhoids discovered on colonoscopy and some evidence of gastritis.  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 Patient's weight improved and he was discharged on the 2/21.     Patient states that since his discharge the dicyclomine was working for his diarrhea, but recently he has been experiencing more constipation and "gets backed up". He endorses some nausea with diffuse abdominal pain radiates to the back that has a non-specific description (sometime crampy, sometimes burning, sometimes stabbing). It is alleviated with hot packs. Worsened by eating and defecation. He denies blood BM. Denies vomiting. He sees two gastroenterologists for his GI issues, Dr. Saavedra and Yao George. Says all workup has been negative but perseverative on SMA. 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 Was physically fit up until 2 years ago (used to run miles). Ambulatory but weak. Has lost a significant amount of weight.

## 2025-03-24 NOTE — H&P ADULT - ASSESSMENT
44-year-old male history of prior C. 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 C/f SMA syndrome vs MALS on previous imaging from last hospitalization. Imaging obtained in ED not consistent with diagnosis of SMA vs MALS. Per surgery, no indication for acute surgical intervention at present. Previous barium esophogram and endoscopic evaluation within normal limits. Differential remains broad including functional dyspepsia, PUD, IBS-C, iatrogenic constipation, gastroparesis, malabsorption.

## 2025-03-24 NOTE — CHART NOTE - NSCHARTNOTEFT_GEN_A_CORE
Practitioner Count: 1  Pharmacy Count: 1  Current Opioid Prescriptions: 0  Current Benzodiazepine Prescriptions: 1  Current Stimulant Prescriptions: 0      Patient Demographic Information (PDI)       PDI	First Name	Last Name	Birth Date	Gender	Street Address	[facility] Code  CHINO Rapp	1980	[place]	[facility]	[id number]    Prescription Information      PDI Filter:    PDI	Current Rx	Drug Type	Rx Written	Rx Dispensed	Drug	Quantity	Days Supply	Prescriber Name	Prescriber GEO #	Payment Method	Dispenser  A	Y	B	03/02/2025	03/12/2025	alprazolam 2 mg tablet	60	30	Bessy Pryor MD	[id number]	[facility] #[id number]  A	N	S	01/31/2025	02/10/2025	dextroamp-amphet er 10 mg cap	30	30	Bessy Pryor MD	[id number]	[facility] #[id number]  A	N	B	01/31/2025	02/10/2025	alprazolam 2 mg tablet	60	30	Bessy Pryor MD	[id number]	[facility] #[id number]  A	N	B	12/20/2024	01/09/2025	alprazolam 2 mg tablet	60	30	Bessy Pryor MD	[id number]	[facility] #[id number]  A	N	S	12/20/2024	01/09/2025	dextroamp-amphet er 10 mg cap	30	30	Bessy Pryor MD	[id number]	[facility] #[id number]  A	N	B	11/26/2024	12/09/2024	alprazolam 2 mg tablet	60	30	Bessy Pryor MD	[id number]	[facility] #[id number]  A	N	S	11/26/2024	12/09/2024	dextroamp-amphet er 10 mg cap	30	30	Bessy Pryor MD	[id number]	Insurance	[facility] #[id number]  A	N	B	10/29/2024	11/09/2024	alprazolam 2 mg tablet	60	30	Bessy Pryor MD	[id number]	Insurance	[facility] #[id number]  A	N	B	09/27/2024	10/09/2024	alprazolam 2 mg tablet	60	30	Bessy Pryor MD	[id number]	Insurance	[facility] #[id number]  A	N	B	09/03/2024	09/11/2024	alprazolam 2 mg tablet	60	30	Bessy Pryor MD	[id number]	[facility] #[id number]  A	N	B	07/26/2024	08/11/2024	alprazolam 2 mg tablet	60	30	Bessy Pryor MD	[id number]	Insurance	[facility] #[id number]  A	N	S	07/26/2024	08/11/2024	dextroamp-amphet er 10 mg cap	30	30	Bessy Pryor MD	[id number]	Insurance	[facility] #[id number]  A	N	B	06/20/2024	07/03/2024	alprazolam 2 mg tablet	60	30	Bessy Pryor MD	[id number]	Insurance	[facility] #[id number]  A	N	S	06/20/2024	07/03/2024	dextroamp-amphet er 10 mg cap	30	30	Bessy Pryor MD	[id number]	Insurance	[facility] #[id number]  A	N	B	05/24/2024	05/30/2024	alprazolam 2 mg tablet	60	30	Bessy Pryor MD	[id number]	Insurance	[facility] #[id number]  A	N	S	05/24/2024	05/30/2024	dextroamp-amphet er 10 mg cap	30	30	Bessy Pryor MD	[id number]	Insurance	[facility] #[id number]  A	N	B	04/25/2024	04/27/2024	alprazolam 2 mg tablet	60	30	Bessy Pryor MD	[id number]	Insurance	[facility] #[id number]  A	N	B	03/26/2024	03/27/2024	alprazolam 2 mg tablet	60	30	Bessy Pryor MD	[id number]	Insurance	[facility] #[id number]

## 2025-03-24 NOTE — ED ADULT NURSE REASSESSMENT NOTE - NS ED NURSE REASSESS COMMENT FT1
pt requesting morning meds and iv tylenol for pain. reached out to admitting YUAN Lau. will come down to the ed to see pt

## 2025-03-25 ENCOUNTER — TRANSCRIPTION ENCOUNTER (OUTPATIENT)
Age: 45
End: 2025-03-25

## 2025-03-25 LAB
ANION GAP SERPL CALC-SCNC: 8 MMOL/L — SIGNIFICANT CHANGE UP (ref 5–17)
BUN SERPL-MCNC: 10 MG/DL — SIGNIFICANT CHANGE UP (ref 7–23)
CALCIUM SERPL-MCNC: 9 MG/DL — SIGNIFICANT CHANGE UP (ref 8.4–10.5)
CHLORIDE SERPL-SCNC: 108 MMOL/L — SIGNIFICANT CHANGE UP (ref 96–108)
CO2 SERPL-SCNC: 26 MMOL/L — SIGNIFICANT CHANGE UP (ref 22–31)
CREAT SERPL-MCNC: 0.78 MG/DL — SIGNIFICANT CHANGE UP (ref 0.5–1.3)
EGFR: 113 ML/MIN/1.73M2 — SIGNIFICANT CHANGE UP
EGFR: 113 ML/MIN/1.73M2 — SIGNIFICANT CHANGE UP
GLUCOSE SERPL-MCNC: 84 MG/DL — SIGNIFICANT CHANGE UP (ref 70–99)
HCT VFR BLD CALC: 37.2 % — LOW (ref 39–50)
HGB BLD-MCNC: 12.6 G/DL — LOW (ref 13–17)
MAGNESIUM SERPL-MCNC: 1.9 MG/DL — SIGNIFICANT CHANGE UP (ref 1.6–2.6)
MCHC RBC-ENTMCNC: 29.9 PG — SIGNIFICANT CHANGE UP (ref 27–34)
MCHC RBC-ENTMCNC: 33.9 G/DL — SIGNIFICANT CHANGE UP (ref 32–36)
MCV RBC AUTO: 88.2 FL — SIGNIFICANT CHANGE UP (ref 80–100)
NRBC BLD AUTO-RTO: 0 /100 WBCS — SIGNIFICANT CHANGE UP (ref 0–0)
PHOSPHATE SERPL-MCNC: 3.3 MG/DL — SIGNIFICANT CHANGE UP (ref 2.5–4.5)
PLATELET # BLD AUTO: 165 K/UL — SIGNIFICANT CHANGE UP (ref 150–400)
POTASSIUM SERPL-MCNC: 4.3 MMOL/L — SIGNIFICANT CHANGE UP (ref 3.5–5.3)
POTASSIUM SERPL-SCNC: 4.3 MMOL/L — SIGNIFICANT CHANGE UP (ref 3.5–5.3)
RBC # BLD: 4.22 M/UL — SIGNIFICANT CHANGE UP (ref 4.2–5.8)
RBC # FLD: 12.4 % — SIGNIFICANT CHANGE UP (ref 10.3–14.5)
SODIUM SERPL-SCNC: 142 MMOL/L — SIGNIFICANT CHANGE UP (ref 135–145)
WBC # BLD: 3.44 K/UL — LOW (ref 3.8–10.5)
WBC # FLD AUTO: 3.44 K/UL — LOW (ref 3.8–10.5)

## 2025-03-25 PROCEDURE — 71045 X-RAY EXAM CHEST 1 VIEW: CPT | Mod: 26

## 2025-03-25 PROCEDURE — 93975 VASCULAR STUDY: CPT | Mod: 26

## 2025-03-25 PROCEDURE — 99233 SBSQ HOSP IP/OBS HIGH 50: CPT | Mod: GC

## 2025-03-25 RX ORDER — NICOTINE POLACRILEX 4 MG/1
4 GUM, CHEWING ORAL EVERY 4 HOURS
Refills: 0 | Status: DISCONTINUED | OUTPATIENT
Start: 2025-03-25 | End: 2025-03-26

## 2025-03-25 RX ORDER — TRAMADOL HYDROCHLORIDE 50 MG/1
25 TABLET, FILM COATED ORAL EVERY 6 HOURS
Refills: 0 | Status: DISCONTINUED | OUTPATIENT
Start: 2025-03-25 | End: 2025-03-26

## 2025-03-25 RX ORDER — POLYETHYLENE GLYCOL 3350 17 G/17G
17 POWDER, FOR SOLUTION ORAL
Refills: 0
Start: 2025-03-25

## 2025-03-25 RX ADMIN — Medication 1 MILLIGRAM(S): at 13:58

## 2025-03-25 RX ADMIN — GABAPENTIN 300 MILLIGRAM(S): 400 CAPSULE ORAL at 21:32

## 2025-03-25 RX ADMIN — GABAPENTIN 300 MILLIGRAM(S): 400 CAPSULE ORAL at 13:32

## 2025-03-25 RX ADMIN — NICOTINE POLACRILEX 1 PATCH: 4 GUM, CHEWING ORAL at 00:09

## 2025-03-25 RX ADMIN — GABAPENTIN 300 MILLIGRAM(S): 400 CAPSULE ORAL at 06:25

## 2025-03-25 RX ADMIN — Medication 40 MILLIGRAM(S): at 06:25

## 2025-03-25 RX ADMIN — TAMSULOSIN HYDROCHLORIDE 0.4 MILLIGRAM(S): 0.4 CAPSULE ORAL at 21:32

## 2025-03-25 RX ADMIN — NICOTINE POLACRILEX 1 PATCH: 4 GUM, CHEWING ORAL at 06:21

## 2025-03-25 RX ADMIN — MIRTAZAPINE 15 MILLIGRAM(S): 30 TABLET, FILM COATED ORAL at 22:55

## 2025-03-25 RX ADMIN — Medication 1 MILLIGRAM(S): at 08:31

## 2025-03-25 RX ADMIN — Medication 2 MILLIGRAM(S): at 21:32

## 2025-03-25 RX ADMIN — Medication 1 TABLET(S): at 08:36

## 2025-03-25 RX ADMIN — NICOTINE POLACRILEX 1 PATCH: 4 GUM, CHEWING ORAL at 18:05

## 2025-03-25 RX ADMIN — Medication 40 MILLIGRAM(S): at 08:36

## 2025-03-25 RX ADMIN — SODIUM CHLORIDE 125 MILLILITER(S): 9 INJECTION, SOLUTION INTRAVENOUS at 06:25

## 2025-03-25 RX ADMIN — Medication 10 MILLIGRAM(S): at 13:32

## 2025-03-25 RX ADMIN — Medication 10 MILLIGRAM(S): at 17:42

## 2025-03-25 RX ADMIN — SERTRALINE 50 MILLIGRAM(S): 100 TABLET, FILM COATED ORAL at 08:36

## 2025-03-25 NOTE — PROGRESS NOTE ADULT - SUBJECTIVE AND OBJECTIVE BOX
PROGRESS NOTE:   Authored by Dr. Leeanna Ozuna    Patient is a 44y old  Male who presents with a chief complaint of Abdominal Pain (24 Mar 2025 13:01)      SUBJECTIVE / OVERNIGHT EVENTS:  No acute events overnight.     MEDICATIONS  (STANDING):  ALPRAZolam 1 milliGRAM(s) Oral <User Schedule>  ALPRAZolam 2 milliGRAM(s) Oral at bedtime  dicyclomine 10 milliGRAM(s) Oral three times a day before meals  famotidine    Tablet 40 milliGRAM(s) Oral daily  gabapentin 300 milliGRAM(s) Oral three times a day  lactated ringers. 1000 milliLiter(s) (125 mL/Hr) IV Continuous <Continuous>  mirtazapine 15 milliGRAM(s) Oral at bedtime  multivitamin 1 Tablet(s) Oral daily  pantoprazole    Tablet 40 milliGRAM(s) Oral before breakfast  polyethylene glycol 3350 17 Gram(s) Oral daily  sertraline 50 milliGRAM(s) Oral daily  tamsulosin 0.4 milliGRAM(s) Oral at bedtime    MEDICATIONS  (PRN):  cetirizine 10 milliGRAM(s) Oral daily PRN for allergies  fluticasone propionate 50 MICROgram(s)/spray Nasal Spray 2 Spray(s) Both Nostrils <User Schedule> PRN allergies  metoclopramide Injectable 5 milliGRAM(s) IV Push every 6 hours PRN nausea      CAPILLARY BLOOD GLUCOSE        I&O's Summary    24 Mar 2025 07:01  -  25 Mar 2025 07:00  --------------------------------------------------------  IN: 1800 mL / OUT: 650 mL / NET: 1150 mL        PHYSICAL EXAM:  Vital Signs Last 24 Hrs  T(C): 36.4 (25 Mar 2025 04:31), Max: 36.8 (24 Mar 2025 08:18)  T(F): 97.6 (25 Mar 2025 04:31), Max: 98.2 (24 Mar 2025 08:18)  HR: 61 (25 Mar 2025 04:31) (51 - 77)  BP: 106/65 (25 Mar 2025 04:31) (106/65 - 120/70)  BP(mean): --  RR: 17 (25 Mar 2025 04:31) (16 - 18)  SpO2: 97% (25 Mar 2025 04:31) (97% - 100%)    Parameters below as of 25 Mar 2025 04:31  Patient On (Oxygen Delivery Method): room air        CONSTITUTIONAL: NAD  HEET: MMM, EOMI, PERRLA  NECK: supple  RESPIRATORY: Normal respiratory effort; lungs are clear to auscultation bilaterally  CARDIOVASCULAR: Regular rate and rhythm, normal S1 and S2, no murmur/rub/gallop; No lower extremity edema; Peripheral pulses are 2+ bilaterally  ABDOMEN: Nontender to palpation, normoactive bowel sounds, no rebound/guarding; No hepatosplenomegaly  MUSCULOSKELETAL: no clubbing or cyanosis of digits; no joint swelling or tenderness to palpation  PSYCH: A+O to person, place, and time; affect appropriate  SKIN: No rash    LABS:                        13.8   5.00  )-----------( 176      ( 23 Mar 2025 22:10 )             40.5     03-23    143  |  104  |  12  ----------------------------<  81  4.5   |  26  |  0.63    Ca    10.2      23 Mar 2025 22:10  Phos  2.7     03-23  Mg     2.0     03-23    TPro  6.7  /  Alb  4.7  /  TBili  0.4  /  DBili  x   /  AST  17  /  ALT  17  /  AlkPhos  39[L]  03-23          Urinalysis Basic - ( 24 Mar 2025 01:13 )    Color: Yellow / Appearance: Clear / SG: >=1.099 / pH: x  Gluc: x / Ketone: Negative mg/dL  / Bili: Negative / Urobili: 1.0 mg/dL   Blood: x / Protein: 30 mg/dL / Nitrite: Negative   Leuk Esterase: Negative / RBC: 0 /HPF / WBC 1 /HPF   Sq Epi: x / Non Sq Epi: 0 /HPF / Bacteria: Negative /HPF        Culture - Urine (collected 24 Mar 2025 01:13)  Source: Clean Catch Clean Catch (Midstream)  Final Report (24 Mar 2025 23:06):    <10,000 CFU/mL Normal Urogenital Simran        Tele Reviewed:    RADIOLOGY & ADDITIONAL TESTS:  Results Reviewed:   Imaging Personally Reviewed:  Electrocardiogram Personally Reviewed:     PROGRESS NOTE:   Authored by Dr. Leeanna Ozuna    Patient is a 44y old  Male who presents with a chief complaint of Abdominal Pain (24 Mar 2025 13:01)      SUBJECTIVE / OVERNIGHT EVENTS:  No acute events overnight. Patient states that his abdominal pain has improved. Passing gas more easily. Tolerating full liquid diet and would like to advance to solids. Denies dysphagia to liquids and/or solids. Has a cousin with a hx of Crohn's, but patient notes that he has had endoscopic biopsies that did not show evidence of IBD.    MEDICATIONS  (STANDING):  ALPRAZolam 1 milliGRAM(s) Oral <User Schedule>  ALPRAZolam 2 milliGRAM(s) Oral at bedtime  dicyclomine 10 milliGRAM(s) Oral three times a day before meals  famotidine    Tablet 40 milliGRAM(s) Oral daily  gabapentin 300 milliGRAM(s) Oral three times a day  lactated ringers. 1000 milliLiter(s) (125 mL/Hr) IV Continuous <Continuous>  mirtazapine 15 milliGRAM(s) Oral at bedtime  multivitamin 1 Tablet(s) Oral daily  pantoprazole    Tablet 40 milliGRAM(s) Oral before breakfast  polyethylene glycol 3350 17 Gram(s) Oral daily  sertraline 50 milliGRAM(s) Oral daily  tamsulosin 0.4 milliGRAM(s) Oral at bedtime    MEDICATIONS  (PRN):  cetirizine 10 milliGRAM(s) Oral daily PRN for allergies  fluticasone propionate 50 MICROgram(s)/spray Nasal Spray 2 Spray(s) Both Nostrils <User Schedule> PRN allergies  metoclopramide Injectable 5 milliGRAM(s) IV Push every 6 hours PRN nausea      CAPILLARY BLOOD GLUCOSE        I&O's Summary    24 Mar 2025 07:01  -  25 Mar 2025 07:00  --------------------------------------------------------  IN: 1800 mL / OUT: 650 mL / NET: 1150 mL        PHYSICAL EXAM:  Vital Signs Last 24 Hrs  T(C): 36.4 (25 Mar 2025 04:31), Max: 36.8 (24 Mar 2025 08:18)  T(F): 97.6 (25 Mar 2025 04:31), Max: 98.2 (24 Mar 2025 08:18)  HR: 61 (25 Mar 2025 04:31) (51 - 77)  BP: 106/65 (25 Mar 2025 04:31) (106/65 - 120/70)  BP(mean): --  RR: 17 (25 Mar 2025 04:31) (16 - 18)  SpO2: 97% (25 Mar 2025 04:31) (97% - 100%)    Parameters below as of 25 Mar 2025 04:31  Patient On (Oxygen Delivery Method): room air    CONSTITUTIONAL: Well groomed, cachetic with orbital fat pad and temporal wasting  EYES: PERRLA and symmetric, EOMI, No conjunctival or scleral injection, non-icteric  ENMT: Oral mucosa with moist membranes.   NECK: Supple  RESP: No respiratory distress, no use of accessory muscles; CTA b/l, no WRR  CV: RRR, +S1S2, no MRG; no JVD; no peripheral edema  GI: Soft, diffusely tender to palpation in all quadrants less than yesterday. No rigidity or guarding.   NEURO: A&Ox3. No focal deficits appreciated.    PSYCH: Appropriate insight/judgment; A+O x 3, mood and affect appropriate, recent/remote memory intact      LABS:                        13.8   5.00  )-----------( 176      ( 23 Mar 2025 22:10 )             40.5     03-23    143  |  104  |  12  ----------------------------<  81  4.5   |  26  |  0.63    Ca    10.2      23 Mar 2025 22:10  Phos  2.7     03-23  Mg     2.0     03-23    TPro  6.7  /  Alb  4.7  /  TBili  0.4  /  DBili  x   /  AST  17  /  ALT  17  /  AlkPhos  39[L]  03-23          Urinalysis Basic - ( 24 Mar 2025 01:13 )    Color: Yellow / Appearance: Clear / SG: >=1.099 / pH: x  Gluc: x / Ketone: Negative mg/dL  / Bili: Negative / Urobili: 1.0 mg/dL   Blood: x / Protein: 30 mg/dL / Nitrite: Negative   Leuk Esterase: Negative / RBC: 0 /HPF / WBC 1 /HPF   Sq Epi: x / Non Sq Epi: 0 /HPF / Bacteria: Negative /HPF        Culture - Urine (collected 24 Mar 2025 01:13)  Source: Clean Catch Clean Catch (Midstream)  Final Report (24 Mar 2025 23:06):    <10,000 CFU/mL Normal Urogenital Simran

## 2025-03-25 NOTE — DISCHARGE NOTE PROVIDER - NSDCFUSCHEDAPPT_GEN_ALL_CORE_FT
Rafa Liu  [facility]  [facility] [address]  Scheduled Appointment: 06/02/2025

## 2025-03-25 NOTE — DISCHARGE NOTE PROVIDER - NSDCCPTREATMENT_GEN_ALL_CORE_FT
PRINCIPAL PROCEDURE  Procedure: US Doppler superior mesenteric vein  Findings and Treatment: IMPRESSION:  1. The aorta, celiac artery, and mesenteric arteries are patent with no   evidence of occlusion or significant stenosis.  2. The celiac artery has peak systolic velocity of 120 cm/s on   inspiration that increases up to 160 cm/s with expiration consistent with   mild compression.  3. The aorto-mesenteric angle measures 36 degrees, within normal limits.   This is improved compared to prior study.      SECONDARY PROCEDURE  Procedure: CT angio abdomen  Findings and Treatment: IMPRESSION:  *  Patent mesenteric arterial vasculature without evidence of significant   stenosis. Specifically, no clear evidence of compression of the celiac   artery by the median arcuate ligament on this nondynamic CT exam.   Correlate with the previous dynamic mesenteric ultrasound exam of   2/14/2025, which was performed during inspiration and expiration.  *  Decreased tree-in-bud nodularity in the left lower lobe since   2/13/2025. A left lower lobe nodule that measures 5 mm previously   measured 4 mm on 2/26/2025 and is new since 2/13/2025.  *  Elongated or enlarged liver with mild periportal edema, similar to   2/13/2025.  *  Borderline splenomegaly, also similar to 2/13/2025.

## 2025-03-25 NOTE — DISCHARGE NOTE PROVIDER - DETAILS OF MALNUTRITION DIAGNOSIS/DIAGNOSES
This patient has been assessed with a concern for Malnutrition and was treated during this hospitalization for the following Nutrition diagnosis/diagnoses:     -  03/26/2025: Severe protein-calorie malnutrition   -  03/26/2025: Underweight (BMI < 19)

## 2025-03-25 NOTE — DISCHARGE NOTE PROVIDER - CARE PROVIDERS DIRECT ADDRESSES
[email or web address]

## 2025-03-25 NOTE — DISCHARGE NOTE PROVIDER - NSDCMRMEDTOKEN_GEN_ALL_CORE_FT
ALPRAZolam 2 mg oral tablet: 0.5 tab(s) orally in the morning, 0.5 tablet at 1pm, 1 tablet at bedtime NOTE: 1 tablet orally 2 times a day  dicyclomine 10 mg oral capsule: 1 cap(s) orally 3 times a day (before meals)  gabapentin 300 mg oral capsule: 1 cap(s) orally 3 times a day  mirtazapine 15 mg oral tablet: 1 tab(s) orally once a day (at bedtime)  montelukast 10 mg oral tablet: 1 tab(s) orally once a day  Multiple Vitamins oral tablet: 1 tab(s) orally once a day  omeprazole 40 mg oral delayed release capsule: 1 cap(s) orally 2 times a day  pantoprazole 40 mg oral delayed release tablet: 1 tab(s) orally once a day (before a meal)  Pepcid 40 mg oral tablet: 1 tab(s) orally once a day (in the morning)  sertraline 50 mg oral tablet: 1 tab(s) orally once a day  tamsulosin 0.4 mg oral capsule: 1 cap(s) orally once a day (at bedtime)  Xyzal 5 mg oral tablet: 1 tab(s) orally once a day   ALPRAZolam 2 mg oral tablet: 0.5 tab(s) orally 2 times a day in the morning, 0.5 tablet at 1pm, 1 tablet at bedtime NOTE: 1 tablet orally 2 times a day  dicyclomine 10 mg oral capsule: 1 cap(s) orally 3 times a day (before meals)  gabapentin 300 mg oral capsule: 1 cap(s) orally 3 times a day  metoclopramide 5 mg oral tablet: 1 tab(s) orally every 6 hours as needed for  severe pain  mirtazapine 15 mg oral tablet: 1 tab(s) orally once a day (at bedtime)  montelukast 10 mg oral tablet: 1 tab(s) orally once a day  Multiple Vitamins oral tablet: 1 tab(s) orally once a day  omeprazole 40 mg oral delayed release capsule: 1 cap(s) orally 2 times a day  pantoprazole 40 mg oral delayed release tablet: 1 tab(s) orally once a day (before a meal)  Pepcid 40 mg oral tablet: 1 tab(s) orally once a day (in the morning)  sertraline 50 mg oral tablet: 1 tab(s) orally once a day  tamsulosin 0.4 mg oral capsule: 1 cap(s) orally once a day (at bedtime)  Xyzal 5 mg oral tablet: 1 tab(s) orally once a day

## 2025-03-25 NOTE — DISCHARGE NOTE PROVIDER - CARE PROVIDER_API CALL
children
Rafa Liu  Surgery  [address], [address]  [place] [address]  Phone: [phone]  Fax: [phone]  Follow Up Time:     Yao George  Gastroenterology  [address], [address]  [place] [address]  Phone: [phone]  Fax: [phone]  Follow Up Time:

## 2025-03-25 NOTE — DISCHARGE NOTE PROVIDER - NSDCCPCAREPLAN_GEN_ALL_CORE_FT
PRINCIPAL DISCHARGE DIAGNOSIS  Diagnosis: Abdominal pain  Assessment and Plan of Treatment: You vame to the hospital with abdominal pain. There was initially concern for SMA syndrome or [place] Arcuate Ligament syndrome, but imaging did not show this. Your symptoms improved with time and medications. Please follow up with you primary care doctor and gastroenterologist.

## 2025-03-25 NOTE — PROGRESS NOTE ADULT - PROBLEM SELECTOR PLAN 1
CT3/23: Patent mesenteric arterial vasculature without evidence of significant stenosis. Specifically, no clear evidence of compression of the celiac artery by the median arcuate ligament on this nondynamic CT exam. Correlate with the previous dynamic mesenteric ultrasound exam of 2/14/2025, which was performed during inspiration and expiration.  -Surgery consulted; No surgical intervention planned at present. Present CT angiogram negative for acute pathology.    Plan  -Bowel regimen: miralax qd, adjust as needed   -Repeat U/S of mesenteric vasculature to r/o SMA syndrome/MALS  -If negative, consider further workup with gastric emptying study and GI consult  -Full liquid diet; advance as tolerated   -IV tylenol PRN for pain   -Gabapentin 300 TID   -Reglan for nausea and to increase GI motility CT3/23: Patent mesenteric arterial vasculature without evidence of significant stenosis. Specifically, no clear evidence of compression of the celiac artery by the median arcuate ligament on this nondynamic CT exam. Correlate with the previous dynamic mesenteric ultrasound exam of 2/14/2025, which was performed during inspiration and expiration.  -Surgery consulted; No surgical intervention planned at present. Present CT angiogram negative for acute pathology.    Plan  -Bowel regimen: miralax qd, adjust as needed   -Repeat U/S of mesenteric vasculature to r/o SMA syndrome/MALS  -Advance from full liquid to solid diet  -IV tylenol PRN for and tramadol for pain  -Gabapentin 300 TID   -Reglan for nausea and to increase GI motility

## 2025-03-25 NOTE — DISCHARGE NOTE PROVIDER - HOSPITAL COURSE
HPI:  44-year-old male history of prior C. difficile with fecal transplants, BPH, recent admission for concern for low flow SMA and MALS, presents with 1.5 weeks of progressively worsening abdominal pain radiating to back and poor PO intake.     According to the patient, he was in his usual state of health when he developed mastoiditis around 2 years ago requiring a course of abx. Patient was treated with clindamycin and subsequently developed C.Difficile colitis requiring fecal transplantation and abx. Patient was recently admitted (02/13-02/21/2025) for c/o abdominal pain w/ 40lbs of weight loss (weight on admission 104) that had been occurring over the past several months associated with post-prandial pain. A mesenteric duplex performed on 2/14 showed  a celiac artery w/ an elevated peak systolic velocity c/f  MALS and an aorto-mesenteric angle compatible with SMA syndrome.  Surgey determined at that time that there was no indication for acute inpatient management and the patient advised to followup w/ Dr. Liu and Dr. Noriega  as an outpatient.  He regained most of the weight when he was discharge from the hospital. Pt was to follow up with Dr. Noriega at [facility] for an outpatient consult and with Dr. Chad Schuster for CTA outpatient. An esophagram ordered during that admission did not disclose any abnormalities. On 2/27,  a repeat duplex was ordered and based on the results, Dr. Henriquez determined there was no evidence of MALS or SMA. Dr. Liu recommended a CCK HIDA scan be done to rule out dyskinesia/hyperkinesia. Patient also saw Dr. Weldon who determined that no surgical intervention was warranted. All other workup, including endoscopy and colonoscopy (11/2024) were within normal limits, except for some hemorrhoids discovered on colonoscopy and some evidence of gastritis.  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 Patient's weight improved and he was discharged on the 2/21.     Patient states that since his discharge the dicyclomine was working for his diarrhea, but recently he has been experiencing more constipation and "gets backed up". He endorses some nausea with diffuse abdominal pain radiates to the back that has a non-specific description (sometime crampy, sometimes burning, sometimes stabbing). It is alleviated with hot packs. Worsened by eating and defecation. He denies blood BM. Denies vomiting. He sees two gastroenterologists for his GI issues, Dr. Saavedra and Yao George. Says all workup has been negative but perseverative on SMA. 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 Was physically fit up until 2 years ago (used to run miles). Ambulatory but weak. Has lost a significant amount of weight.    (24 Mar 2025 13:01)    Hospital Course: Patient admitted to medicine for further workup of abdominal pain. Surgery was consulted while the patient was in the ED and determined no need for acute surgical intervention. CTA was within normal limits. He was treated with antiemetics, fluids, supplemental nutrition, as needed pain medications, and a bowel regimen, and his symptoms started to improve. Repeat duplex on 3/25 showed ..........    On day of discharge, patient is clinically stable with no new exam findings or acute symptoms compared to prior. The patient was seen by the attending physician on the date of discharge and deemed stable and acceptable for discharge. The patient's chronic medical conditions were treated accordingly per the patient's home medication regimen. The patient's medication reconciliation, follow up appointments, discharge instructions, and significant lab and diagnostic studies are as noted.      Important Medication Changes and Reason:    START:   CHANGE:   STOP:       Active or Pending Issues Requiring Follow-up:    Advanced Directives:   [X] Full code  [ ] DNR  [ ] Hospice    Discharge Diagnoses:  Abdominal pain         HPI:  44-year-old male history of prior C. difficile with fecal transplants, BPH, recent admission for concern for low flow SMA and MALS, presents with 1.5 weeks of progressively worsening abdominal pain radiating to back and poor PO intake.     According to the patient, he was in his usual state of health when he developed mastoiditis around 2 years ago requiring a course of abx. Patient was treated with clindamycin and subsequently developed C.Difficile colitis requiring fecal transplantation and abx. Patient was recently admitted (02/13-02/21/2025) for c/o abdominal pain w/ 40lbs of weight loss (weight on admission 104) that had been occurring over the past several months associated with post-prandial pain. A mesenteric duplex performed on 2/14 showed  a celiac artery w/ an elevated peak systolic velocity c/f  MALS and an aorto-mesenteric angle compatible with SMA syndrome.  Surgey determined at that time that there was no indication for acute inpatient management and the patient advised to followup w/ Dr. Liu and Dr. Noriega  as an outpatient.  He regained most of the weight when he was discharge from the hospital. Pt was to follow up with Dr. Noriega at [facility] for an outpatient consult and with Dr. Chad Schuster for CTA outpatient. An esophagram ordered during that admission did not disclose any abnormalities. On 2/27,  a repeat duplex was ordered and based on the results, Dr. Henriquez determined there was no evidence of MALS or SMA. Dr. Liu recommended a CCK HIDA scan be done to rule out dyskinesia/hyperkinesia. Patient also saw Dr. Weldon who determined that no surgical intervention was warranted. All other workup, including endoscopy and colonoscopy (11/2024) were within normal limits, except for some hemorrhoids discovered on colonoscopy and some evidence of gastritis.  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 Patient's weight improved and he was discharged on the 2/21.     Patient states that since his discharge the dicyclomine was working for his diarrhea, but recently he has been experiencing more constipation and "gets backed up". He endorses some nausea with diffuse abdominal pain radiates to the back that has a non-specific description (sometime crampy, sometimes burning, sometimes stabbing). It is alleviated with hot packs. Worsened by eating and defecation. He denies blood BM. Denies vomiting. He sees two gastroenterologists for his GI issues, Dr. Saavedra and Yao George. Says all workup has been negative but perseverative on SMA. 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 Was physically fit up until 2 years ago (used to run miles). Ambulatory but weak. Has lost a significant amount of weight.    (24 Mar 2025 13:01)    Hospital Course: Patient admitted to medicine for further workup of abdominal pain. Surgery was consulted while the patient was in the ED and determined no need for acute surgical intervention. CTA was within normal limits. He was treated with antiemetics, fluids, supplemental nutrition, as needed pain medications, and a bowel regimen, and his symptoms started to improve. Repeat duplex on 3/25 showed possible mild compression of celiac artery but no other abnormality.    On day of discharge, patient is clinically stable with no new exam findings or acute symptoms compared to prior. The patient was seen by the attending physician on the date of discharge and deemed stable and acceptable for discharge. The patient's chronic medical conditions were treated accordingly per the patient's home medication regimen. The patient's medication reconciliation, follow up appointments, discharge instructions, and significant lab and diagnostic studies are as noted.      Important Medication Changes and Reason:    START: reglan 5mg prn x5d      Active or Pending Issues Requiring Follow-up:    Advanced Directives:   [X] Full code  [ ] DNR  [ ] Hospice    Discharge Diagnoses:  Abdominal pain

## 2025-03-25 NOTE — PROGRESS NOTE ADULT - ASSESSMENT
44-year-old male history of prior C. 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 C/f SMA syndrome vs MALS on previous imaging from last hospitalization. Imaging obtained in ED not consistent with diagnosis of SMA vs MALS. Per surgery, no indication for acute surgical intervention at present. Previous barium esophogram and endoscopic evaluation within normal limits. Differential remains broad including functional dyspepsia, PUD, IBS-C, iatrogenic constipation, gastroparesis, malabsorption.  44-year-old male history of prior C. 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 C/f SMA syndrome vs MALS on previous imaging from last hospitalization. Imaging obtained in ED not consistent with diagnosis of SMA vs MALS. Per surgery, no indication for acute surgical intervention at present. Previous barium esophogram and endoscopic evaluation within normal limits. Differential remains broad including functional dyspepsia, PUD, IBS-C, iatrogenic constipation, gastroparesis, malabsorption. Pending repeat U/S of mesentery to r/o SMA.

## 2025-03-26 ENCOUNTER — TRANSCRIPTION ENCOUNTER (OUTPATIENT)
Age: 45
End: 2025-03-26

## 2025-03-26 VITALS
DIASTOLIC BLOOD PRESSURE: 60 MMHG | HEART RATE: 58 BPM | RESPIRATION RATE: 18 BRPM | TEMPERATURE: 98 F | OXYGEN SATURATION: 99 % | SYSTOLIC BLOOD PRESSURE: 106 MMHG

## 2025-03-26 PROCEDURE — 93005 ELECTROCARDIOGRAM TRACING: CPT

## 2025-03-26 PROCEDURE — 81001 URINALYSIS AUTO W/SCOPE: CPT

## 2025-03-26 PROCEDURE — 85027 COMPLETE CBC AUTOMATED: CPT

## 2025-03-26 PROCEDURE — 80053 COMPREHEN METABOLIC PANEL: CPT

## 2025-03-26 PROCEDURE — 87086 URINE CULTURE/COLONY COUNT: CPT

## 2025-03-26 PROCEDURE — 83735 ASSAY OF MAGNESIUM: CPT

## 2025-03-26 PROCEDURE — 84295 ASSAY OF SERUM SODIUM: CPT

## 2025-03-26 PROCEDURE — 71045 X-RAY EXAM CHEST 1 VIEW: CPT

## 2025-03-26 PROCEDURE — 99239 HOSP IP/OBS DSCHRG MGMT >30: CPT | Mod: GC

## 2025-03-26 PROCEDURE — 82435 ASSAY OF BLOOD CHLORIDE: CPT

## 2025-03-26 PROCEDURE — 82330 ASSAY OF CALCIUM: CPT

## 2025-03-26 PROCEDURE — 84100 ASSAY OF PHOSPHORUS: CPT

## 2025-03-26 PROCEDURE — 84132 ASSAY OF SERUM POTASSIUM: CPT

## 2025-03-26 PROCEDURE — 96374 THER/PROPH/DIAG INJ IV PUSH: CPT

## 2025-03-26 PROCEDURE — 83605 ASSAY OF LACTIC ACID: CPT

## 2025-03-26 PROCEDURE — 80048 BASIC METABOLIC PNL TOTAL CA: CPT

## 2025-03-26 PROCEDURE — 85018 HEMOGLOBIN: CPT

## 2025-03-26 PROCEDURE — 85014 HEMATOCRIT: CPT

## 2025-03-26 PROCEDURE — 93975 VASCULAR STUDY: CPT

## 2025-03-26 PROCEDURE — 99285 EMERGENCY DEPT VISIT HI MDM: CPT

## 2025-03-26 PROCEDURE — 74174 CTA ABD&PLVS W/CONTRAST: CPT | Mod: MC

## 2025-03-26 PROCEDURE — 85025 COMPLETE CBC W/AUTO DIFF WBC: CPT

## 2025-03-26 PROCEDURE — 82803 BLOOD GASES ANY COMBINATION: CPT

## 2025-03-26 PROCEDURE — 82947 ASSAY GLUCOSE BLOOD QUANT: CPT

## 2025-03-26 RX ORDER — ALPRAZOLAM 0.5 MG
0.5 TABLET, EXTENDED RELEASE 24 HR ORAL
Qty: 0 | Refills: 0 | DISCHARGE

## 2025-03-26 RX ORDER — METOCLOPRAMIDE HCL 10 MG
1 TABLET ORAL
Qty: 16 | Refills: 0
Start: 2025-03-26 | End: 2025-03-29

## 2025-03-26 RX ADMIN — Medication 1 TABLET(S): at 11:26

## 2025-03-26 RX ADMIN — Medication 1 MILLIGRAM(S): at 05:15

## 2025-03-26 RX ADMIN — Medication 10 MILLIGRAM(S): at 07:39

## 2025-03-26 RX ADMIN — Medication 40 MILLIGRAM(S): at 05:16

## 2025-03-26 RX ADMIN — Medication 10 MILLIGRAM(S): at 17:47

## 2025-03-26 RX ADMIN — GABAPENTIN 300 MILLIGRAM(S): 400 CAPSULE ORAL at 14:03

## 2025-03-26 RX ADMIN — Medication 10 MILLIGRAM(S): at 13:03

## 2025-03-26 RX ADMIN — SERTRALINE 50 MILLIGRAM(S): 100 TABLET, FILM COATED ORAL at 07:40

## 2025-03-26 RX ADMIN — GABAPENTIN 300 MILLIGRAM(S): 400 CAPSULE ORAL at 05:15

## 2025-03-26 RX ADMIN — Medication 40 MILLIGRAM(S): at 11:25

## 2025-03-26 RX ADMIN — Medication 1 MILLIGRAM(S): at 14:03

## 2025-03-26 NOTE — DISCHARGE NOTE NURSING/CASE MANAGEMENT/SOCIAL WORK - PATIENT PORTAL LINK FT
You can access the FollowMyHealth Patient Portal offered by [facility] by registering at the following website: [email or web address]. By joining [employer]’s FollowMyHealth portal, you will also be able to view your health information using other applications (apps) compatible with our system.

## 2025-03-26 NOTE — PROGRESS NOTE ADULT - ATTENDING COMMENTS
Patient seen and examined at bedside. No acute events overnight. His US seems better than prior. He is tolerating diet. No further inpatient management. He will need to follow up outpatient with his surgeon Jerry Weldon for possible SMA vs. MALS (though per outpatient provider likely the former)    Discharge Time: 35 minutes
Patient seen and examined at bedside. No acute events overnight. Still with pain, but not worsen. Tolerating PO somewhat. US Mesenteric seems not actionable as it wasn't last time. Long discussion with patient and wife at bedside. Ultimately he needs to follow up outpatient for longitudinal care. Expect discharge tomorrow.

## 2025-03-26 NOTE — PROGRESS NOTE ADULT - ASSESSMENT
44-year-old male history of prior C. 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 C/f SMA syndrome vs MALS on previous imaging from last hospitalization. Imaging obtained in ED not consistent with diagnosis of SMA vs MALS. Per surgery, no indication for acute surgical intervention at present. Previous barium esophogram and endoscopic evaluation within normal limits. Differential remains broad including functional dyspepsia, PUD, IBS-C, iatrogenic constipation, gastroparesis, malabsorption. Pending repeat U/S of mesentery to r/o SMA. 44-year-old male history of prior C. 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 C/f SMA syndrome vs MALS on previous imaging from last hospitalization. Imaging obtained in ED not consistent with diagnosis of SMA vs MALS. Per surgery, no indication for acute surgical intervention at present. Previous barium esophogram and endoscopic evaluation within normal limits. Differential remains broad including functional dyspepsia, PUD, IBS-C, iatrogenic constipation, gastroparesis, malabsorption. Repeat U/S unimpressive. Tolerating feeds now. Pending DC.

## 2025-03-26 NOTE — DISCHARGE NOTE NURSING/CASE MANAGEMENT/SOCIAL WORK - FINANCIAL ASSISTANCE
[facility] provides services at a reduced cost to those who are determined to be eligible through [facility]’s financial assistance program. Information regarding [facility]’s financial assistance program can be found by going to [email or web address] or by calling [phone].

## 2025-03-26 NOTE — DIETITIAN INITIAL EVALUATION ADULT - PROBLEM SELECTOR PLAN 1
CT3/23: Patent mesenteric arterial vasculature without evidence of significant stenosis. Specifically, no clear evidence of compression of the celiac artery by the median arcuate ligament on this nondynamic CT exam. Correlate with the previous dynamic mesenteric ultrasound exam of 2/14/2025, which was performed during inspiration and expiration.  -Surgery consulted; No surgical intervention planned at present. Present CT angiogram negative for acute pathology.    Plan  -Bowel regimen: miralax qd, adjust as needed   -Repeat U/S of mesenteric vasculature to r/o SMA syndrome/MALS  -If negative, consider further workup with gastric emptying study and GI consult  -Full liquid diet; advance as tolerated   -IV tylenol PRN for pain   -Gabapentin 300 TID   -Reglan for nausea and to increase GI motility

## 2025-03-26 NOTE — DIETITIAN INITIAL EVALUATION ADULT - PERTINENT LABORATORY DATA
03-25    142  |  108  |  10  ----------------------------<  84  4.3   |  26  |  0.78    Ca    9.0      25 Mar 2025 07:16  Phos  3.3     03-25  Mg     1.9     03-25

## 2025-03-26 NOTE — DIETITIAN INITIAL EVALUATION ADULT - FUNCTIONAL SCREEN CURRENT LEVEL: SWALLOWING (IF SCORE 2 OR MORE FOR ANY ITEM, CONSULT REHAB SERVICES), MLM)
We agreed to proceed w right ureteroscopy and laser lithotripsy with possible stent. 0 = swallows foods/liquids without difficulty

## 2025-03-26 NOTE — DIETITIAN INITIAL EVALUATION ADULT - ENERGY INTAKE
Fair (50-75%) Pt reports slightly improved appetite/PO intake in-house, breakfast tray observed. Pt noted consumed most of his meals. Pt is also ordered 5-6 [employer] 1.0 in-house. Pt reports he had [employer] 4x yesterday and tolerating well without any GI issues reported. Pt made aware RD remains available and will follow up for any additional questions/concerns and per protocol.

## 2025-03-26 NOTE — DIETITIAN INITIAL EVALUATION ADULT - PHYSCIAL ASSESSMENT
Nutrition focused physical exam performed with patient's consent. Pt confirms his height to be 5'9''. Patient reports 40 lbs (25%)weight loss from 160-120lbs x 2 years in setting of C. difficile infections, not clinically significant.     Per [facility] weight history: (2/2025)121lbs; (10/2023)128lbs; (5/2018)167lbs;     IBW: 160lbs  IBW%: 75%

## 2025-03-26 NOTE — PROGRESS NOTE ADULT - PROBLEM SELECTOR PLAN 1
CT3/23: Patent mesenteric arterial vasculature without evidence of significant stenosis. Specifically, no clear evidence of compression of the celiac artery by the median arcuate ligament on this nondynamic CT exam. Correlate with the previous dynamic mesenteric ultrasound exam of 2/14/2025, which was performed during inspiration and expiration.  -Surgery consulted; No surgical intervention planned at present. Present CT angiogram negative for acute pathology.    Plan  -Bowel regimen: miralax qd, adjust as needed   -Repeat U/S of mesenteric vasculature to r/o SMA syndrome/MALS  -Advance from full liquid to solid diet  -IV tylenol PRN for and tramadol for pain  -Gabapentin 300 TID   -Reglan for nausea and to increase GI motility CT3/23: Patent mesenteric arterial vasculature without evidence of significant stenosis. Specifically, no clear evidence of compression of the celiac artery by the median arcuate ligament on this nondynamic CT exam. Correlate with the previous dynamic mesenteric ultrasound exam of 2/14/2025, which was performed during inspiration and expiration.  -Surgery consulted; No surgical intervention planned at present. Present CT angiogram negative for acute pathology.    Plan  -Bowel regimen: miralax qd, adjust as needed   -Repeat U/S of mesenteric vasculature to r/o SMA syndrome/MALS; unimpressive  -IV tylenol PRN for and tramadol for pain  -Gabapentin 300 TID   -Reglan for nausea and to increase GI motility

## 2025-03-26 NOTE — DISCHARGE NOTE NURSING/CASE MANAGEMENT/SOCIAL WORK - NSDCPEFALRISK_GEN_ALL_CORE
For information on Fall & Injury Prevention, visit: [email or web address] OR  [email or web address] OR  https://www.cdc.gov/steadi/patient.html

## 2025-03-26 NOTE — DIETITIAN INITIAL EVALUATION ADULT - PERSON TAUGHT/METHOD
Emphasized the importance of adequate kcal and protein intake; recommend to optimize nutritional intake in case of decreased appetite; recommended small frequent meals by ordering nutrient-dense snacks and leaving non-perishable food away from tray for later consumption during the day or between meals; to start with protein, and sips of supplement throughout the day; reviewed foods with protein and menu order procedures in hospital. Pt/family made aware RD remains available and will follow up for any additional questions/concerns and per protocol./verbal instruction/patient instructed

## 2025-03-26 NOTE — DIETITIAN INITIAL EVALUATION ADULT - ETIOLOGY
decreased ability to consume adequate protein-energy intake in setting of clinical course/ C. difficile infection

## 2025-03-26 NOTE — DIETITIAN INITIAL EVALUATION ADULT - ADD RECOMMEND
1. Recommend to continue Regular diet as ordered and tolerated  2. Recommend [employer] 1.0 TID with an additional [employer] per patient's request and preferences, this will provide (1300kcal, and 64g protein) to optimize PO intake  3. Continue multivitamin as ordered   4. Continue to monitor PO intake, weight trend, electrolytes, blood glucose, labs, BMs in-house   5. Malnutrition notification placed in chart.

## 2025-03-26 NOTE — PROGRESS NOTE ADULT - PROBLEM SELECTOR PLAN 6
- DVT ppx: ambulatory  - Diet: Full liquid  - Activity: activity as tolerated   - Dispo: pending.
- DVT ppx: ambulatory  - Diet: Full liquid  - Activity: activity as tolerated   - Dispo: pending.

## 2025-03-26 NOTE — DIETITIAN INITIAL EVALUATION ADULT - ORAL INTAKE PTA/DIET HISTORY
Writer met patient at the bedside. Patient reports good appetite/PO intake at baseline. Usually consumes 6-8 small frequent meals daily. Typically meal of the day is B: 1 large egg, turkey mar with tortilla wraps, homoe fries, mini blueberry muffin; L: Beef broth, Turkey burger, rosa rice, sweet potatoes, carrot cake; D: Beef broth, grilled chicken, rosa rice, sweet potatoes; patient drinks 3-4 [employer] in-between meals daily.  Follows a Regular diet, does not follow any dietary restrictions. States he is lactose intolerance. Takes a multivitamin daily at home. Denies chewing/swallowing difficulties. Endorsees episode of nausea without emesis PTA. States having some constipations at home.  Writer met patient at the bedside. Patient reports good appetite/PO intake at baseline. Usually consumes 6-8 small frequent meals daily. Typically meal of the day is Breakfat: 1 large egg, turkey mar with tortilla wraps, home fries, mini blueberry muffin; Lunch: Beef broth, Turkey burger, rosa rice, sweet potatoes, carrot cake; Dinner: Beef broth, grilled chicken, rosa rice, sweet potatoes; patient drinks 3-4 [employer] in-between meals daily.  Follows a Regular diet, does not follow any dietary restrictions. States he is lactose intolerance. Takes a multivitamin daily at home. Denies chewing/swallowing difficulties. Endorsees episode of nausea without emesis PTA. States having some constipations at home.

## 2025-03-26 NOTE — PROGRESS NOTE ADULT - PROBLEM SELECTOR PLAN 2
-s/p fecal transplant and treatment     -if patient develops worsening diarrhea, retest for C. difficile
-s/p fecal transplant and treatment     -if patient develops worsening diarrhea, retest for C. difficile

## 2025-03-26 NOTE — DIETITIAN INITIAL EVALUATION ADULT - OTHER INFO
- hx of C. difficile with fecal transplants  - GI: ordered for pepcid, reglan, protonix, miralax   - Ordered for multivitamin

## 2025-03-26 NOTE — PROGRESS NOTE ADULT - SUBJECTIVE AND OBJECTIVE BOX
PROGRESS NOTE:   Authored by Dr. Leeanna Ozuna    Patient is a 44y old  Male who presents with a chief complaint of Abdominal Pain (25 Mar 2025 13:41)      SUBJECTIVE / OVERNIGHT EVENTS:  No acute events overnight.     MEDICATIONS  (STANDING):  ALPRAZolam 1 milliGRAM(s) Oral <User Schedule>  ALPRAZolam 2 milliGRAM(s) Oral at bedtime  dicyclomine 10 milliGRAM(s) Oral three times a day before meals  famotidine    Tablet 40 milliGRAM(s) Oral daily  gabapentin 300 milliGRAM(s) Oral three times a day  mirtazapine 15 milliGRAM(s) Oral at bedtime  multivitamin 1 Tablet(s) Oral daily  pantoprazole    Tablet 40 milliGRAM(s) Oral before breakfast  polyethylene glycol 3350 17 Gram(s) Oral daily  sertraline 50 milliGRAM(s) Oral daily  tamsulosin 0.4 milliGRAM(s) Oral at bedtime    MEDICATIONS  (PRN):  cetirizine 10 milliGRAM(s) Oral daily PRN for allergies  fluticasone propionate 50 MICROgram(s)/spray Nasal Spray 2 Spray(s) Both Nostrils <User Schedule> PRN allergies  metoclopramide Injectable 5 milliGRAM(s) IV Push every 6 hours PRN nausea  nicotine  Polacrilex Lozenge 4 milliGRAM(s) Oral every 4 hours PRN craving  traMADol 25 milliGRAM(s) Oral every 6 hours PRN Severe Pain (7 - 10)      CAPILLARY BLOOD GLUCOSE        I&O's Summary      PHYSICAL EXAM:  Vital Signs Last 24 Hrs  T(C): 36.5 (26 Mar 2025 05:03), Max: 36.7 (25 Mar 2025 22:22)  T(F): 97.7 (26 Mar 2025 05:03), Max: 98.1 (25 Mar 2025 22:22)  HR: 63 (26 Mar 2025 05:03) (62 - 63)  BP: 100/63 (26 Mar 2025 05:03) (100/63 - 105/66)  BP(mean): --  RR: 18 (26 Mar 2025 05:03) (18 - 18)  SpO2: 98% (26 Mar 2025 05:03) (97% - 98%)    Parameters below as of 26 Mar 2025 05:03  Patient On (Oxygen Delivery Method): room air        CONSTITUTIONAL: NAD  HEET: MMM, EOMI, PERRLA  NECK: supple  RESPIRATORY: Normal respiratory effort; lungs are clear to auscultation bilaterally  CARDIOVASCULAR: Regular rate and rhythm, normal S1 and S2, no murmur/rub/gallop; No lower extremity edema; Peripheral pulses are 2+ bilaterally  ABDOMEN: Nontender to palpation, normoactive bowel sounds, no rebound/guarding; No hepatosplenomegaly  MUSCULOSKELETAL: no clubbing or cyanosis of digits; no joint swelling or tenderness to palpation  PSYCH: A+O to person, place, and time; affect appropriate  SKIN: No rash    LABS:                        12.6   3.44  )-----------( 165      ( 25 Mar 2025 07:18 )             37.2     03-25    142  |  108  |  10  ----------------------------<  84  4.3   |  26  |  0.78    Ca    9.0      25 Mar 2025 07:16  Phos  3.3     03-25  Mg     1.9     03-25            Urinalysis Basic - ( 25 Mar 2025 07:16 )    Color: x / Appearance: x / SG: x / pH: x  Gluc: 84 mg/dL / Ketone: x  / Bili: x / Urobili: x   Blood: x / Protein: x / Nitrite: x   Leuk Esterase: x / RBC: x / WBC x   Sq Epi: x / Non Sq Epi: x / Bacteria: x        Culture - Urine (collected 24 Mar 2025 01:13)  Source: Clean Catch Clean Catch (Midstream)  Final Report (24 Mar 2025 23:06):    <10,000 CFU/mL Normal Urogenital Simran        Tele Reviewed:    RADIOLOGY & ADDITIONAL TESTS:  Results Reviewed:   Imaging Personally Reviewed:  Electrocardiogram Personally Reviewed:     PROGRESS NOTE:   Authored by Dr. Leeanna Ozuna    Patient is a 44y old  Male who presents with a chief complaint of Abdominal Pain (25 Mar 2025 13:41)      SUBJECTIVE / OVERNIGHT EVENTS:  No acute events overnight. Tolerating solids better. Pain improved.    MEDICATIONS  (STANDING):  ALPRAZolam 1 milliGRAM(s) Oral <User Schedule>  ALPRAZolam 2 milliGRAM(s) Oral at bedtime  dicyclomine 10 milliGRAM(s) Oral three times a day before meals  famotidine    Tablet 40 milliGRAM(s) Oral daily  gabapentin 300 milliGRAM(s) Oral three times a day  mirtazapine 15 milliGRAM(s) Oral at bedtime  multivitamin 1 Tablet(s) Oral daily  pantoprazole    Tablet 40 milliGRAM(s) Oral before breakfast  polyethylene glycol 3350 17 Gram(s) Oral daily  sertraline 50 milliGRAM(s) Oral daily  tamsulosin 0.4 milliGRAM(s) Oral at bedtime    MEDICATIONS  (PRN):  cetirizine 10 milliGRAM(s) Oral daily PRN for allergies  fluticasone propionate 50 MICROgram(s)/spray Nasal Spray 2 Spray(s) Both Nostrils <User Schedule> PRN allergies  metoclopramide Injectable 5 milliGRAM(s) IV Push every 6 hours PRN nausea  nicotine  Polacrilex Lozenge 4 milliGRAM(s) Oral every 4 hours PRN craving  traMADol 25 milliGRAM(s) Oral every 6 hours PRN Severe Pain (7 - 10)      CAPILLARY BLOOD GLUCOSE        I&O's Summary      PHYSICAL EXAM:  Vital Signs Last 24 Hrs  T(C): 36.5 (26 Mar 2025 05:03), Max: 36.7 (25 Mar 2025 22:22)  T(F): 97.7 (26 Mar 2025 05:03), Max: 98.1 (25 Mar 2025 22:22)  HR: 63 (26 Mar 2025 05:03) (62 - 63)  BP: 100/63 (26 Mar 2025 05:03) (100/63 - 105/66)  BP(mean): --  RR: 18 (26 Mar 2025 05:03) (18 - 18)  SpO2: 98% (26 Mar 2025 05:03) (97% - 98%)    Parameters below as of 26 Mar 2025 05:03  Patient On (Oxygen Delivery Method): room air    CONSTITUTIONAL: Well groomed, cachetic with orbital fat pad and temporal wasting  EYES: PERRLA and symmetric, EOMI, No conjunctival or scleral injection, non-icteric  ENMT: Oral mucosa with moist membranes.   NECK: Supple  RESP: No respiratory distress, no use of accessory muscles; CTA b/l, no WRR  CV: RRR, +S1S2, no MRG; no JVD; no peripheral edema  GI: Soft, diffusely tender to palpation in all quadrants less than yesterday. No rigidity or guarding.   NEURO: A&Ox3. No focal deficits appreciated.    PSYCH: Appropriate insight/judgment; A+O x 3, mood and affect appropriate, recent/remote memory intact      LABS:                        12.6   3.44  )-----------( 165      ( 25 Mar 2025 07:18 )             37.2     03-25    142  |  108  |  10  ----------------------------<  84  4.3   |  26  |  0.78    Ca    9.0      25 Mar 2025 07:16  Phos  3.3     03-25  Mg     1.9     03-25            Urinalysis Basic - ( 25 Mar 2025 07:16 )    Color: x / Appearance: x / SG: x / pH: x  Gluc: 84 mg/dL / Ketone: x  / Bili: x / Urobili: x   Blood: x / Protein: x / Nitrite: x   Leuk Esterase: x / RBC: x / WBC x   Sq Epi: x / Non Sq Epi: x / Bacteria: x        Culture - Urine (collected 24 Mar 2025 01:13)  Source: Clean Catch Clean Catch (Midstream)  Final Report (24 Mar 2025 23:06):    <10,000 CFU/mL Normal Urogenital Simran          RADIOLOGY & ADDITIONAL TESTS:  Results Reviewed:   Imaging Personally Reviewed:  Electrocardiogram Personally Reviewed:

## 2025-03-26 NOTE — DIETITIAN INITIAL EVALUATION ADULT - PERTINENT MEDS FT
MEDICATIONS  (STANDING):  ALPRAZolam 1 milliGRAM(s) Oral <User Schedule>  ALPRAZolam 2 milliGRAM(s) Oral at bedtime  dicyclomine 10 milliGRAM(s) Oral three times a day before meals  famotidine    Tablet 40 milliGRAM(s) Oral daily  gabapentin 300 milliGRAM(s) Oral three times a day  mirtazapine 15 milliGRAM(s) Oral at bedtime  multivitamin 1 Tablet(s) Oral daily  pantoprazole    Tablet 40 milliGRAM(s) Oral before breakfast  polyethylene glycol 3350 17 Gram(s) Oral daily  sertraline 50 milliGRAM(s) Oral daily  tamsulosin 0.4 milliGRAM(s) Oral at bedtime    MEDICATIONS  (PRN):  cetirizine 10 milliGRAM(s) Oral daily PRN for allergies  fluticasone propionate 50 MICROgram(s)/spray Nasal Spray 2 Spray(s) Both Nostrils <User Schedule> PRN allergies  metoclopramide Injectable 5 milliGRAM(s) IV Push every 6 hours PRN nausea  nicotine  Polacrilex Lozenge 4 milliGRAM(s) Oral every 4 hours PRN craving  traMADol 25 milliGRAM(s) Oral every 6 hours PRN Severe Pain (7 - 10)

## 2025-03-26 NOTE — PROGRESS NOTE ADULT - PROBLEM SELECTOR PLAN 5
-Nutrition consulted  -Trend weight   -Trend I&Os   -Monitor BMP and watch for signs of refeeding syndrome  -Advance diet as tolerated  -Mirtazepine for appetite stimulation   -Reglan for nausea -Nutrition consulted; appreciate recs   -Trend weight   -Trend I&Os   -Monitor BMP and watch for signs of refeeding syndrome  -Advance diet as tolerated  -Mirtazepine for appetite stimulation   -Reglan for nausea

## 2025-06-02 ENCOUNTER — APPOINTMENT (OUTPATIENT)
Dept: SURGERY | Facility: CLINIC | Age: 45
End: 2025-06-02